# Patient Record
Sex: MALE | Race: WHITE | Employment: OTHER | URBAN - METROPOLITAN AREA
[De-identification: names, ages, dates, MRNs, and addresses within clinical notes are randomized per-mention and may not be internally consistent; named-entity substitution may affect disease eponyms.]

---

## 2020-02-24 ENCOUNTER — OFFICE VISIT (OUTPATIENT)
Dept: INTERNAL MEDICINE CLINIC | Facility: CLINIC | Age: 70
End: 2020-02-24
Payer: MEDICARE

## 2020-02-24 VITALS
HEART RATE: 71 BPM | RESPIRATION RATE: 12 BRPM | HEIGHT: 65 IN | SYSTOLIC BLOOD PRESSURE: 126 MMHG | WEIGHT: 181 LBS | BODY MASS INDEX: 30.16 KG/M2 | TEMPERATURE: 99 F | DIASTOLIC BLOOD PRESSURE: 70 MMHG

## 2020-02-24 DIAGNOSIS — I10 ESSENTIAL HYPERTENSION: ICD-10-CM

## 2020-02-24 DIAGNOSIS — E11.9 CONTROLLED TYPE 2 DIABETES MELLITUS WITHOUT COMPLICATION, WITHOUT LONG-TERM CURRENT USE OF INSULIN (HCC): Primary | ICD-10-CM

## 2020-02-24 DIAGNOSIS — Z23 IMMUNIZATION DUE: ICD-10-CM

## 2020-02-24 DIAGNOSIS — E78.00 HYPERCHOLESTEREMIA: ICD-10-CM

## 2020-02-24 PROCEDURE — G0463 HOSPITAL OUTPT CLINIC VISIT: HCPCS | Performed by: INTERNAL MEDICINE

## 2020-02-24 PROCEDURE — G0008 ADMIN INFLUENZA VIRUS VAC: HCPCS | Performed by: INTERNAL MEDICINE

## 2020-02-24 PROCEDURE — 90670 PCV13 VACCINE IM: CPT | Performed by: INTERNAL MEDICINE

## 2020-02-24 PROCEDURE — 96372 THER/PROPH/DIAG INJ SC/IM: CPT | Performed by: INTERNAL MEDICINE

## 2020-02-24 PROCEDURE — 99203 OFFICE O/P NEW LOW 30 MIN: CPT | Performed by: INTERNAL MEDICINE

## 2020-02-24 PROCEDURE — 90662 IIV NO PRSV INCREASED AG IM: CPT | Performed by: INTERNAL MEDICINE

## 2020-02-24 PROCEDURE — G0009 ADMIN PNEUMOCOCCAL VACCINE: HCPCS | Performed by: INTERNAL MEDICINE

## 2020-02-24 RX ORDER — ROSUVASTATIN CALCIUM 10 MG/1
10 TABLET, COATED ORAL NIGHTLY
Qty: 90 TABLET | Refills: 0 | Status: SHIPPED | OUTPATIENT
Start: 2020-02-24 | End: 2020-10-15

## 2020-02-24 RX ORDER — BLOOD SUGAR DIAGNOSTIC
STRIP MISCELLANEOUS
COMMUNITY
Start: 2019-10-08 | End: 2021-11-01

## 2020-02-24 RX ORDER — AMOXICILLIN 500 MG
1 CAPSULE ORAL DAILY
COMMUNITY

## 2020-02-24 RX ORDER — ROSUVASTATIN CALCIUM 10 MG/1
1 TABLET, COATED ORAL NIGHTLY
COMMUNITY
Start: 2020-01-03 | End: 2020-02-24

## 2020-02-24 RX ORDER — MULTIVITAMIN
1 TABLET ORAL DAILY
COMMUNITY

## 2020-02-24 RX ORDER — RAMIPRIL 5 MG/1
5 CAPSULE ORAL DAILY
COMMUNITY
Start: 2020-01-03 | End: 2020-02-24

## 2020-02-24 RX ORDER — RAMIPRIL 5 MG/1
5 CAPSULE ORAL DAILY
Qty: 90 CAPSULE | Refills: 0 | Status: SHIPPED | OUTPATIENT
Start: 2020-02-24 | End: 2020-10-15

## 2020-02-24 RX ORDER — LANCETS
EACH MISCELLANEOUS
COMMUNITY
Start: 2019-10-08

## 2020-02-24 NOTE — PROGRESS NOTES
HPI:    Patient ID: Tena Bernardo is a 78year old male. Diabetes   He presents for his initial diabetic visit. He has type 2 diabetes mellitus. The initial diagnosis of diabetes was made 1 year ago. His disease course has been stable.  There are no hypog gradually improving since onset. The problem is controlled. Pertinent negatives include no chest pain, palpitations, peripheral edema or shortness of breath. There are no associated agents to hypertension.  Risk factors for coronary artery disease include d Allergies:No Known Allergies   PHYSICAL EXAM:   Physical Exam   Constitutional: He is oriented to person, place, and time. He appears well-developed and well-nourished. No distress. HENT:   Head: Normocephalic and atraumatic.    Right Ear: External ea not recall his last A1c done 4 months ago so we will be rechecking his A1c and also do a UA. He saw his optometrist for an eye examination 6 months ago and there was no signs of diabetic retinopathy.   Patient will continue to check his glucose levels at l

## 2020-03-05 ENCOUNTER — APPOINTMENT (OUTPATIENT)
Dept: LAB | Age: 70
End: 2020-03-05
Attending: INTERNAL MEDICINE
Payer: MEDICARE

## 2020-03-05 DIAGNOSIS — E78.00 HYPERCHOLESTEREMIA: ICD-10-CM

## 2020-03-05 DIAGNOSIS — E11.9 CONTROLLED TYPE 2 DIABETES MELLITUS WITHOUT COMPLICATION, WITHOUT LONG-TERM CURRENT USE OF INSULIN (HCC): ICD-10-CM

## 2020-03-05 LAB
ALBUMIN SERPL-MCNC: 4.2 G/DL (ref 3.4–5)
ALBUMIN/GLOB SERPL: 1.3 {RATIO} (ref 1–2)
ALP LIVER SERPL-CCNC: 72 U/L (ref 45–117)
ALT SERPL-CCNC: 32 U/L (ref 16–61)
ANION GAP SERPL CALC-SCNC: 5 MMOL/L (ref 0–18)
AST SERPL-CCNC: 11 U/L (ref 15–37)
BILIRUB SERPL-MCNC: 0.5 MG/DL (ref 0.1–2)
BILIRUB UR QL: NEGATIVE
BUN BLD-MCNC: 16 MG/DL (ref 7–18)
BUN/CREAT SERPL: 15.1 (ref 10–20)
CALCIUM BLD-MCNC: 9.5 MG/DL (ref 8.5–10.1)
CHLORIDE SERPL-SCNC: 104 MMOL/L (ref 98–112)
CHOLEST SMN-MCNC: 126 MG/DL (ref ?–200)
CLARITY UR: CLEAR
CO2 SERPL-SCNC: 29 MMOL/L (ref 21–32)
COLOR UR: YELLOW
CREAT BLD-MCNC: 1.06 MG/DL (ref 0.7–1.3)
CREAT UR-SCNC: 115 MG/DL
EST. AVERAGE GLUCOSE BLD GHB EST-MCNC: 203 MG/DL (ref 68–126)
GLOBULIN PLAS-MCNC: 3.3 G/DL (ref 2.8–4.4)
GLUCOSE BLD-MCNC: 166 MG/DL (ref 70–99)
GLUCOSE UR-MCNC: NEGATIVE MG/DL
HBA1C MFR BLD HPLC: 8.7 % (ref ?–5.7)
HDLC SERPL-MCNC: 40 MG/DL (ref 40–59)
HGB UR QL STRIP.AUTO: NEGATIVE
KETONES UR-MCNC: NEGATIVE MG/DL
LDLC SERPL CALC-MCNC: 49 MG/DL (ref ?–100)
LEUKOCYTE ESTERASE UR QL STRIP.AUTO: NEGATIVE
M PROTEIN MFR SERPL ELPH: 7.5 G/DL (ref 6.4–8.2)
MICROALBUMIN UR-MCNC: 1.39 MG/DL
MICROALBUMIN/CREAT 24H UR-RTO: 12.1 UG/MG (ref ?–30)
NITRITE UR QL STRIP.AUTO: NEGATIVE
NONHDLC SERPL-MCNC: 86 MG/DL (ref ?–130)
OSMOLALITY SERPL CALC.SUM OF ELEC: 291 MOSM/KG (ref 275–295)
PATIENT FASTING Y/N/NP: YES
PATIENT FASTING Y/N/NP: YES
PH UR: 6 [PH] (ref 5–8)
POTASSIUM SERPL-SCNC: 4.4 MMOL/L (ref 3.5–5.1)
PROT UR-MCNC: NEGATIVE MG/DL
SODIUM SERPL-SCNC: 138 MMOL/L (ref 136–145)
SP GR UR STRIP: 1.02 (ref 1–1.03)
TRIGL SERPL-MCNC: 186 MG/DL (ref 30–149)
UROBILINOGEN UR STRIP-ACNC: <2
VLDLC SERPL CALC-MCNC: 37 MG/DL (ref 0–30)

## 2020-03-05 PROCEDURE — 36415 COLL VENOUS BLD VENIPUNCTURE: CPT

## 2020-03-05 PROCEDURE — 82043 UR ALBUMIN QUANTITATIVE: CPT

## 2020-03-05 PROCEDURE — 82570 ASSAY OF URINE CREATININE: CPT

## 2020-03-05 PROCEDURE — 83036 HEMOGLOBIN GLYCOSYLATED A1C: CPT

## 2020-03-05 PROCEDURE — 80061 LIPID PANEL: CPT

## 2020-03-05 PROCEDURE — 81003 URINALYSIS AUTO W/O SCOPE: CPT

## 2020-03-05 PROCEDURE — 80053 COMPREHEN METABOLIC PANEL: CPT

## 2020-05-26 ENCOUNTER — OFFICE VISIT (OUTPATIENT)
Dept: INTERNAL MEDICINE CLINIC | Facility: CLINIC | Age: 70
End: 2020-05-26
Payer: MEDICARE

## 2020-05-26 VITALS
RESPIRATION RATE: 12 BRPM | HEART RATE: 65 BPM | SYSTOLIC BLOOD PRESSURE: 124 MMHG | BODY MASS INDEX: 31.32 KG/M2 | TEMPERATURE: 99 F | HEIGHT: 65 IN | DIASTOLIC BLOOD PRESSURE: 78 MMHG | WEIGHT: 188 LBS

## 2020-05-26 DIAGNOSIS — E78.00 HYPERCHOLESTEREMIA: ICD-10-CM

## 2020-05-26 DIAGNOSIS — I10 ESSENTIAL HYPERTENSION: ICD-10-CM

## 2020-05-26 DIAGNOSIS — E11.65 UNCONTROLLED TYPE 2 DIABETES MELLITUS WITH HYPERGLYCEMIA (HCC): Primary | ICD-10-CM

## 2020-05-26 DIAGNOSIS — Z12.11 COLON CANCER SCREENING: ICD-10-CM

## 2020-05-26 PROCEDURE — G0463 HOSPITAL OUTPT CLINIC VISIT: HCPCS | Performed by: INTERNAL MEDICINE

## 2020-05-26 PROCEDURE — 99214 OFFICE O/P EST MOD 30 MIN: CPT | Performed by: INTERNAL MEDICINE

## 2020-05-26 NOTE — PROGRESS NOTES
HPI:    Patient ID: Camelia Root is a 78year old male. Diabetes   He presents for his follow-up diabetic visit. He has type 2 diabetes mellitus. His disease course has been fluctuating. There are no hypoglycemic associated symptoms.  There are no diabet Exacerbating diseases include diabetes. He has no history of chronic renal disease. There are no known factors aggravating his hyperlipidemia. Pertinent negatives include no chest pain or shortness of breath.  Current antihyperlipidemic treatment includes s normal.   Left Ear: External ear normal.   Nose: Nose normal.   Mouth/Throat: Oropharynx is clear and moist. No oropharyngeal exudate. Eyes: Pupils are equal, round, and reactive to light. Conjunctivae and EOM are normal. Right eye exhibits no discharge. CHF (congestive heart failure)    HLD (hyperlipidemia) Metabolic Panel (14) [E]      Lipid Panel [E]      Meds This Visit:  Requested Prescriptions      No prescriptions requested or ordered in this encounter       Imaging & Referrals:  None       AP#[id number] CHF (congestive heart failure)    Essential hypertension    HLD (hyperlipidemia)

## 2020-06-18 ENCOUNTER — APPOINTMENT (OUTPATIENT)
Dept: LAB | Age: 70
End: 2020-06-18
Attending: INTERNAL MEDICINE
Payer: MEDICARE

## 2020-06-18 DIAGNOSIS — E11.65 UNCONTROLLED TYPE 2 DIABETES MELLITUS WITH HYPERGLYCEMIA (HCC): ICD-10-CM

## 2020-06-18 DIAGNOSIS — E78.00 HYPERCHOLESTEREMIA: ICD-10-CM

## 2020-06-18 PROCEDURE — 80053 COMPREHEN METABOLIC PANEL: CPT

## 2020-06-18 PROCEDURE — 83036 HEMOGLOBIN GLYCOSYLATED A1C: CPT

## 2020-06-18 PROCEDURE — 36415 COLL VENOUS BLD VENIPUNCTURE: CPT

## 2020-06-18 PROCEDURE — 80061 LIPID PANEL: CPT

## 2020-06-20 ENCOUNTER — APPOINTMENT (OUTPATIENT)
Dept: LAB | Age: 70
End: 2020-06-20
Attending: INTERNAL MEDICINE
Payer: MEDICARE

## 2020-06-20 DIAGNOSIS — Z12.11 COLON CANCER SCREENING: ICD-10-CM

## 2020-06-20 PROCEDURE — 82274 ASSAY TEST FOR BLOOD FECAL: CPT

## 2020-06-23 RX ORDER — GLIMEPIRIDE 1 MG/1
1 TABLET ORAL
Qty: 90 TABLET | Refills: 0 | Status: SHIPPED | OUTPATIENT
Start: 2020-06-23 | End: 2020-07-10

## 2020-07-10 ENCOUNTER — TELEPHONE (OUTPATIENT)
Dept: INTERNAL MEDICINE CLINIC | Facility: CLINIC | Age: 70
End: 2020-07-10

## 2020-07-10 RX ORDER — GLIMEPIRIDE 1 MG/1
1 TABLET ORAL
Qty: 90 TABLET | Refills: 0 | Status: SHIPPED | OUTPATIENT
Start: 2020-07-10 | End: 2020-10-15

## 2020-07-10 NOTE — TELEPHONE ENCOUNTER
•  glimepiride 1 MG Oral Tab, Take 1 tablet (1 mg total) by mouth daily with breakfast., Disp: 90 tablet, Rfl: 0

## 2020-10-15 ENCOUNTER — OFFICE VISIT (OUTPATIENT)
Dept: INTERNAL MEDICINE CLINIC | Facility: CLINIC | Age: 70
End: 2020-10-15
Payer: MEDICARE

## 2020-10-15 VITALS
DIASTOLIC BLOOD PRESSURE: 76 MMHG | BODY MASS INDEX: 31.49 KG/M2 | HEIGHT: 65 IN | TEMPERATURE: 97 F | HEART RATE: 70 BPM | SYSTOLIC BLOOD PRESSURE: 128 MMHG | WEIGHT: 189 LBS

## 2020-10-15 DIAGNOSIS — I10 ESSENTIAL HYPERTENSION: ICD-10-CM

## 2020-10-15 DIAGNOSIS — E78.00 HYPERCHOLESTEREMIA: ICD-10-CM

## 2020-10-15 DIAGNOSIS — E11.65 UNCONTROLLED TYPE 2 DIABETES MELLITUS WITH HYPERGLYCEMIA (HCC): Primary | ICD-10-CM

## 2020-10-15 PROCEDURE — 36416 COLLJ CAPILLARY BLOOD SPEC: CPT | Performed by: INTERNAL MEDICINE

## 2020-10-15 PROCEDURE — 83036 HEMOGLOBIN GLYCOSYLATED A1C: CPT | Performed by: INTERNAL MEDICINE

## 2020-10-15 PROCEDURE — 90662 IIV NO PRSV INCREASED AG IM: CPT | Performed by: INTERNAL MEDICINE

## 2020-10-15 PROCEDURE — G0463 HOSPITAL OUTPT CLINIC VISIT: HCPCS | Performed by: INTERNAL MEDICINE

## 2020-10-15 PROCEDURE — G0008 ADMIN INFLUENZA VIRUS VAC: HCPCS | Performed by: INTERNAL MEDICINE

## 2020-10-15 PROCEDURE — 99214 OFFICE O/P EST MOD 30 MIN: CPT | Performed by: INTERNAL MEDICINE

## 2020-10-15 RX ORDER — GLIMEPIRIDE 1 MG/1
1 TABLET ORAL
Qty: 90 TABLET | Refills: 1 | Status: CANCELLED | OUTPATIENT
Start: 2020-10-15

## 2020-10-15 RX ORDER — ROSUVASTATIN CALCIUM 10 MG/1
10 TABLET, COATED ORAL NIGHTLY
Qty: 90 TABLET | Refills: 1 | Status: SHIPPED | OUTPATIENT
Start: 2020-10-15 | End: 2021-05-14

## 2020-10-15 RX ORDER — GLIMEPIRIDE 2 MG/1
2 TABLET ORAL
Qty: 90 TABLET | Refills: 1 | Status: SHIPPED | OUTPATIENT
Start: 2020-10-15 | End: 2021-05-14

## 2020-10-15 RX ORDER — RAMIPRIL 5 MG/1
5 CAPSULE ORAL DAILY
Qty: 90 CAPSULE | Refills: 1 | Status: SHIPPED | OUTPATIENT
Start: 2020-10-15 | End: 2021-05-14

## 2020-10-15 NOTE — PROGRESS NOTES
HPI:    Patient ID: Levern Soulier is a 69year old male. Diabetes  He presents for his follow-up diabetic visit. He has type 2 diabetes mellitus. His disease course has been fluctuating. There are no hypoglycemic associated symptoms.  There are no diabeti are normal. Exacerbating diseases include diabetes. He has no history of chronic renal disease. There are no known factors aggravating his hyperlipidemia. Pertinent negatives include no chest pain or shortness of breath.  Current antihyperlipidemic treatmen Essential hypertension    • Hyperlipidemia       History reviewed. No pertinent surgical history.    Family History   Problem Relation Age of Onset   • Stroke Mother    • Diabetes Sister    • Cancer Brother    • Diabetes Brother       Social History: Social today and showed 8 so dm improving but told him needs to get to 7; increased glimepiride to 2mg daily . Monitor glucose daily and warned about hypoglycemia as we tighten control. Pt told how to manage if hypoglycemia occures. We will see him back in 3mos.

## 2021-02-03 DIAGNOSIS — Z23 NEED FOR VACCINATION: ICD-10-CM

## 2021-02-05 ENCOUNTER — TELEPHONE (OUTPATIENT)
Dept: INTERNAL MEDICINE CLINIC | Facility: CLINIC | Age: 71
End: 2021-02-05

## 2021-02-09 ENCOUNTER — IMMUNIZATION (OUTPATIENT)
Dept: LAB | Age: 71
End: 2021-02-09
Attending: HOSPITALIST
Payer: MEDICARE

## 2021-02-09 DIAGNOSIS — Z23 NEED FOR VACCINATION: Primary | ICD-10-CM

## 2021-02-09 PROCEDURE — 0001A SARSCOV2 VAC 30MCG/0.3ML IM: CPT

## 2021-03-02 ENCOUNTER — IMMUNIZATION (OUTPATIENT)
Dept: LAB | Age: 71
End: 2021-03-02
Attending: HOSPITALIST
Payer: MEDICARE

## 2021-03-02 DIAGNOSIS — Z23 NEED FOR VACCINATION: Primary | ICD-10-CM

## 2021-03-02 PROCEDURE — 0002A SARSCOV2 VAC 30MCG/0.3ML IM: CPT

## 2021-05-14 ENCOUNTER — LAB ENCOUNTER (OUTPATIENT)
Dept: LAB | Age: 71
End: 2021-05-14
Attending: INTERNAL MEDICINE
Payer: MEDICARE

## 2021-05-14 ENCOUNTER — TELEPHONE (OUTPATIENT)
Dept: INTERNAL MEDICINE CLINIC | Facility: CLINIC | Age: 71
End: 2021-05-14

## 2021-05-14 ENCOUNTER — OFFICE VISIT (OUTPATIENT)
Dept: INTERNAL MEDICINE CLINIC | Facility: CLINIC | Age: 71
End: 2021-05-14
Payer: MEDICARE

## 2021-05-14 VITALS
HEART RATE: 61 BPM | WEIGHT: 185 LBS | RESPIRATION RATE: 12 BRPM | TEMPERATURE: 98 F | BODY MASS INDEX: 30.82 KG/M2 | DIASTOLIC BLOOD PRESSURE: 70 MMHG | SYSTOLIC BLOOD PRESSURE: 126 MMHG | HEIGHT: 65 IN

## 2021-05-14 DIAGNOSIS — E11.65 UNCONTROLLED TYPE 2 DIABETES MELLITUS WITH HYPERGLYCEMIA (HCC): Primary | ICD-10-CM

## 2021-05-14 DIAGNOSIS — E11.65 UNCONTROLLED TYPE 2 DIABETES MELLITUS WITH HYPERGLYCEMIA (HCC): ICD-10-CM

## 2021-05-14 DIAGNOSIS — Z12.11 COLON CANCER SCREENING: ICD-10-CM

## 2021-05-14 DIAGNOSIS — L98.9 SKIN LESIONS: ICD-10-CM

## 2021-05-14 DIAGNOSIS — E11.59 HYPERTENSION ASSOCIATED WITH TYPE 2 DIABETES MELLITUS (HCC): ICD-10-CM

## 2021-05-14 DIAGNOSIS — L03.116 CELLULITIS OF LEFT LOWER EXTREMITY: ICD-10-CM

## 2021-05-14 DIAGNOSIS — E78.5 HYPERLIPIDEMIA ASSOCIATED WITH TYPE 2 DIABETES MELLITUS (HCC): ICD-10-CM

## 2021-05-14 DIAGNOSIS — E11.9 DIABETIC EYE EXAM (HCC): ICD-10-CM

## 2021-05-14 DIAGNOSIS — I15.2 HYPERTENSION ASSOCIATED WITH TYPE 2 DIABETES MELLITUS (HCC): ICD-10-CM

## 2021-05-14 DIAGNOSIS — Z12.5 PROSTATE CANCER SCREENING: ICD-10-CM

## 2021-05-14 DIAGNOSIS — Z01.00 DIABETIC EYE EXAM (HCC): ICD-10-CM

## 2021-05-14 DIAGNOSIS — Z13.6 SCREENING FOR AAA (ABDOMINAL AORTIC ANEURYSM): ICD-10-CM

## 2021-05-14 DIAGNOSIS — E11.69 HYPERLIPIDEMIA ASSOCIATED WITH TYPE 2 DIABETES MELLITUS (HCC): ICD-10-CM

## 2021-05-14 PROCEDURE — 82570 ASSAY OF URINE CREATININE: CPT

## 2021-05-14 PROCEDURE — 83036 HEMOGLOBIN GLYCOSYLATED A1C: CPT | Performed by: INTERNAL MEDICINE

## 2021-05-14 PROCEDURE — 85025 COMPLETE CBC W/AUTO DIFF WBC: CPT

## 2021-05-14 PROCEDURE — G0009 ADMIN PNEUMOCOCCAL VACCINE: HCPCS | Performed by: INTERNAL MEDICINE

## 2021-05-14 PROCEDURE — 80061 LIPID PANEL: CPT

## 2021-05-14 PROCEDURE — 82043 UR ALBUMIN QUANTITATIVE: CPT

## 2021-05-14 PROCEDURE — 36415 COLL VENOUS BLD VENIPUNCTURE: CPT

## 2021-05-14 PROCEDURE — 99214 OFFICE O/P EST MOD 30 MIN: CPT | Performed by: INTERNAL MEDICINE

## 2021-05-14 PROCEDURE — 90732 PPSV23 VACC 2 YRS+ SUBQ/IM: CPT | Performed by: INTERNAL MEDICINE

## 2021-05-14 PROCEDURE — 80053 COMPREHEN METABOLIC PANEL: CPT

## 2021-05-14 RX ORDER — ROSUVASTATIN CALCIUM 10 MG/1
10 TABLET, COATED ORAL NIGHTLY
Qty: 90 TABLET | Refills: 1 | Status: SHIPPED | OUTPATIENT
Start: 2021-05-14 | End: 2021-11-01

## 2021-05-14 RX ORDER — RAMIPRIL 5 MG/1
5 CAPSULE ORAL DAILY
Qty: 90 CAPSULE | Refills: 1 | Status: SHIPPED | OUTPATIENT
Start: 2021-05-14 | End: 2021-11-01

## 2021-05-14 RX ORDER — GLIMEPIRIDE 2 MG/1
2 TABLET ORAL
Qty: 90 TABLET | Refills: 1 | Status: SHIPPED | OUTPATIENT
Start: 2021-05-14 | End: 2021-11-01

## 2021-05-14 RX ORDER — CEPHALEXIN 500 MG/1
500 CAPSULE ORAL 2 TIMES DAILY
Qty: 14 CAPSULE | Refills: 0 | Status: SHIPPED | OUTPATIENT
Start: 2021-05-14 | End: 2021-11-01 | Stop reason: ALTCHOICE

## 2021-05-14 NOTE — PROGRESS NOTES
HPI/Subjective:     Patient ID: Juliet Hilliard is a 69year old male. Patient also complained of redness and some soreness on the left ankle/lateral malleolar area. Started 2 days ago after he walked in the park.  He denies having trauma, twisting his left chronic renal disease or a hypertension causing med. Hyperlipidemia  This is a chronic problem. The current episode started more than 1 year ago. The problem is controlled.  Recent lipid tests were reviewed and are normal. Exacerbating diseases include di Oral Cap Take 1 tablet by mouth daily.        Allergies:No Known Allergies    Past Medical History:   Diagnosis Date   • Calculus of kidney     2003   • Diabetes ([address]     2019   • Essential hypertension    • Hyperlipidemia       No past surgical history on Genitourinary:     Prostate: Normal. Not enlarged, not tender and no nodules present. Rectum: Normal. No mass, tenderness, anal fissure or external hemorrhoid. Normal anal tone. Musculoskeletal:         General: Normal range of motion.       Cervical E11.9) Diabetic eye exam ([facility] [address]  Plan: OPHTHALMOLOGY - INTERNAL       Pt advised to see optha for dm eye exam. Referral given.      (Z13.6) Screening for AAA (abdominal aortic aneurysm)  Plan: US ABDOMINAL AORTIC ANEURYSM SCREENING         (CPT=76706)

## 2021-05-14 NOTE — TELEPHONE ENCOUNTER
Spoke with pt's daughter per [employer], TERRI verified. She stated pt need rx ref for Ramipril  5 mg, rosuvastain 10 mg every day. pls advise, thanks in advance.          Requested Prescriptions     Pending Prescriptions Disp Refills   • ramipril 5 MG Oral Cap 9

## 2021-05-14 NOTE — TELEPHONE ENCOUNTER
I had sent erx this morning yamilet for these meds but I  Signed erx again just in case pharmacy didn't get it before.

## 2021-05-14 NOTE — TELEPHONE ENCOUNTER
Spoke to [place] and patient's daughter picked up Glimepiride and Metformin. She thought there were two more medications. Tried to call daughter. Shanna Cotter to verify which medications were missing. Left message to call back on her voicemail.

## 2021-05-14 NOTE — TELEPHONE ENCOUNTER
Patient's Daughter is calling and stating patient is currently at the pharmacy and they do not have anything from Dr. Chastity Nascimento for him.      It looks like they were sent over this morning around 9:05 AM. But patient is insisting they don't have anything f

## 2021-05-14 NOTE — TELEPHONE ENCOUNTER
Daughter requesting to know if patient should still be taking these medications. ramipril 5 MG Oral Cap 90 capsule 1 10/15/2020    Sig:   Take 1 capsule (5 mg total) by mouth daily.      Route:   Oral     Order #: [id number]       Rosuvastatin Calcium 10

## 2021-05-15 NOTE — TELEPHONE ENCOUNTER
I want to make sure he gets all his regular meds, metformin, glimepiride and rosuvastatin. Also I had also sent abx cephalexin yesterday for possible skin infection on his left ankle so also make sure he gets it.  maria alejandra deng.

## 2021-05-17 NOTE — TELEPHONE ENCOUNTER
Spoke with pt daughter and pt daughter states she will contact pt to make sure he picked up antibiotics with his other medications.

## 2021-05-28 ENCOUNTER — LAB ENCOUNTER (OUTPATIENT)
Dept: LAB | Age: 71
End: 2021-05-28
Attending: INTERNAL MEDICINE
Payer: MEDICARE

## 2021-05-28 DIAGNOSIS — E78.5 HYPERLIPIDEMIA ASSOCIATED WITH TYPE 2 DIABETES MELLITUS (HCC): ICD-10-CM

## 2021-05-28 DIAGNOSIS — E11.69 HYPERLIPIDEMIA ASSOCIATED WITH TYPE 2 DIABETES MELLITUS (HCC): ICD-10-CM

## 2021-05-28 DIAGNOSIS — E11.59 HYPERTENSION ASSOCIATED WITH TYPE 2 DIABETES MELLITUS (HCC): ICD-10-CM

## 2021-05-28 DIAGNOSIS — Z12.11 COLON CANCER SCREENING: ICD-10-CM

## 2021-05-28 DIAGNOSIS — I15.2 HYPERTENSION ASSOCIATED WITH TYPE 2 DIABETES MELLITUS (HCC): ICD-10-CM

## 2021-05-28 DIAGNOSIS — E11.65 UNCONTROLLED TYPE 2 DIABETES MELLITUS WITH HYPERGLYCEMIA (HCC): ICD-10-CM

## 2021-05-28 PROCEDURE — 82274 ASSAY TEST FOR BLOOD FECAL: CPT

## 2021-06-01 ENCOUNTER — TELEPHONE (OUTPATIENT)
Dept: INTERNAL MEDICINE CLINIC | Facility: CLINIC | Age: 71
End: 2021-06-01

## 2021-06-01 DIAGNOSIS — Z12.11 ENCOUNTER FOR SCREENING COLONOSCOPY: Primary | ICD-10-CM

## 2021-06-01 DIAGNOSIS — R19.5 POSITIVE FIT (FECAL IMMUNOCHEMICAL TEST): ICD-10-CM

## 2021-06-01 NOTE — TELEPHONE ENCOUNTER
Spoke with patient and let him know test results and to follow up with GI, Dr Graeme Fowler. Pt provided with name and phone number for GI office. Dr Yue Kaufman if referral below looks correct, please sign off. Thank you.

## 2021-07-23 ENCOUNTER — TELEPHONE (OUTPATIENT)
Dept: INTERNAL MEDICINE CLINIC | Facility: CLINIC | Age: 71
End: 2021-07-23

## 2021-07-23 NOTE — TELEPHONE ENCOUNTER
pls call pt and check if he already had made apptment or seen gastro as advised back in May 2021 for a positive fit stool test, he needs to get colonoscopy to evaluate further and make sure no growth/tumors or even colon cancer.

## 2021-07-23 NOTE — TELEPHONE ENCOUNTER
Advised patient of Dr. Gracie Rubio note. Patient verbalized understanding and did not schedule appointment with GI yet. Warm transferred patient to GI.      Future Appointments   Date Time Provider Nelson Pollock   9/21/2021 10:00 AM Miya Victoria

## 2021-09-19 NOTE — H&P
[address] [facility] [address] Gastroenterology                                                                                                               Reason for consult: p used    Alcohol use: Not Currently    Drug use: Never       Medications (Active prior to today's visit):  Current Outpatient Medications   Medication Sig Dispense Refill   • glimepiride 2 MG Oral Tab Take 1 tablet (2 mg total) by mouth every morning before trachea midline  CV: RRR, the extremities are warm and well perfused   LUNGS: No increased work of breathing  ABDOMEN: No scars, normal bowel sounds, soft, non-tender, non-distended no rebound or guarding, no masses, no hepatomegaly  MSK: No redness, no wa BUN/CREA Ratio 16.1 10.0 - 20.0    Calcium, Total 9.3 8.5 - 10.1 mg/dL    Calculated Osmolality 287 275 - 295 mOsm/kg    GFR, Non- 82 >=60    GFR, -American 95 >=60    ALT 31 16 - 61 U/L    AST 22 15 - 37 U/L    Alkaline Phosphata ordered in this encounter       Imaging & Referrals:  None    ENDOSCOPIC RISK BENEFIT DISCUSSION: I described the procedure in great detail with the patient.  I discussed the risks and benefits, including but not limited to: bleeding, perforation, infection

## 2021-09-19 NOTE — H&P (VIEW-ONLY)
[address] [facility] [address] Gastroenterology                                                                                                               Reason for consult: p used    Alcohol use: Not Currently    Drug use: Never       Medications (Active prior to today's visit):  Current Outpatient Medications   Medication Sig Dispense Refill   • glimepiride 2 MG Oral Tab Take 1 tablet (2 mg total) by mouth every morning before trachea midline  CV: RRR, the extremities are warm and well perfused   LUNGS: No increased work of breathing  ABDOMEN: No scars, normal bowel sounds, soft, non-tender, non-distended no rebound or guarding, no masses, no hepatomegaly  MSK: No redness, no wa BUN/CREA Ratio 16.1 10.0 - 20.0    Calcium, Total 9.3 8.5 - 10.1 mg/dL    Calculated Osmolality 287 275 - 295 mOsm/kg    GFR, Non- 82 >=60    GFR, -American 95 >=60    ALT 31 16 - 61 U/L    AST 22 15 - 37 U/L    Alkaline Phosphata ordered in this encounter       Imaging & Referrals:  None    ENDOSCOPIC RISK BENEFIT DISCUSSION: I described the procedure in great detail with the patient.  I discussed the risks and benefits, including but not limited to: bleeding, perforation, infection

## 2021-09-21 ENCOUNTER — OFFICE VISIT (OUTPATIENT)
Dept: GASTROENTEROLOGY | Facility: CLINIC | Age: 71
End: 2021-09-21
Payer: MEDICARE

## 2021-09-21 ENCOUNTER — TELEPHONE (OUTPATIENT)
Dept: GASTROENTEROLOGY | Facility: CLINIC | Age: 71
End: 2021-09-21

## 2021-09-21 VITALS
SYSTOLIC BLOOD PRESSURE: 136 MMHG | BODY MASS INDEX: 31.99 KG/M2 | WEIGHT: 192 LBS | DIASTOLIC BLOOD PRESSURE: 73 MMHG | HEART RATE: 71 BPM | HEIGHT: 65 IN

## 2021-09-21 DIAGNOSIS — R19.5 OCCULT BLOOD POSITIVE STOOL: Primary | ICD-10-CM

## 2021-09-21 DIAGNOSIS — R19.5 POSITIVE FIT (FECAL IMMUNOCHEMICAL TEST): Primary | ICD-10-CM

## 2021-09-21 PROCEDURE — 99203 OFFICE O/P NEW LOW 30 MIN: CPT | Performed by: NURSE PRACTITIONER

## 2021-09-21 RX ORDER — POLYETHYLENE GLYCOL 3350, SODIUM CHLORIDE, SODIUM BICARBONATE, POTASSIUM CHLORIDE 420; 11.2; 5.72; 1.48 G/4L; G/4L; G/4L; G/4L
POWDER, FOR SOLUTION ORAL
Qty: 4000 ML | Refills: 0 | Status: SHIPPED | OUTPATIENT
Start: 2021-09-21 | End: 2021-10-14

## 2021-09-21 NOTE — TELEPHONE ENCOUNTER
Scheduled for:  Colonoscopy 35877  Provider Name:  Dr Luana Llamas  Date:  10/14/21  Location:  [address] [place] [address]  Sedation:  MAC  Time:  8:30am (pt is aware to arrive at 7:30am)  Prep:  Trilyte  Meds/Allergies Reconciled?:  Beverley/NP reviewed.    Diagnosis with codes:  Posi

## 2021-09-21 NOTE — PATIENT INSTRUCTIONS
1. Schedule colonoscopy with MAC w/ first avail [Diagnosis: pos fit]    2.  bowel prep from pharmacy (split Segopotsote)    3. Hold glimepriride, metformin day before and day of procedure  Hold ramipril day of procedure    4.  Read all bowel prep instr

## 2021-10-08 ENCOUNTER — TELEPHONE (OUTPATIENT)
Dept: GASTROENTEROLOGY | Facility: CLINIC | Age: 71
End: 2021-10-08

## 2021-10-08 NOTE — TELEPHONE ENCOUNTER
Román Salinas scheduled for colonoscopy with Dr. Katlin Costello next Thursday, 10/14/2021. [facility] location and 7:30 AM arrival time. Patient was scheduled at [facility] and physical copy handed to him. Returned Carolyn call to clarify any questions/concerns.

## 2021-10-08 NOTE — TELEPHONE ENCOUNTER
Pts daughter called to review instructions for pts 10/14/21 colonoscopy. She is having problems getting into My Chart to read them. Please all.

## 2021-10-11 ENCOUNTER — TELEPHONE (OUTPATIENT)
Dept: GASTROENTEROLOGY | Facility: CLINIC | Age: 71
End: 2021-10-11

## 2021-10-11 ENCOUNTER — LAB ENCOUNTER (OUTPATIENT)
Dept: LAB | Age: 71
End: 2021-10-11
Attending: INTERNAL MEDICINE
Payer: MEDICARE

## 2021-10-11 DIAGNOSIS — Z01.818 PRE-OP TESTING: ICD-10-CM

## 2021-10-11 NOTE — TELEPHONE ENCOUNTER
Provided patients daughter with below update. Advised will be reaching on Wednesday morning for symptom update along with awaiting covid final results. Will re-evaluate at this time to assure optimal to proceed with scheduled colonoscopy.

## 2021-10-11 NOTE — TELEPHONE ENCOUNTER
Would wait for COVID test and call back with symptoms Wed morning and if still ongoing would have to clear with anesthesia and consider rapid test as well.  It is diagnostic for + FIT so do not want to delay too much

## 2021-10-11 NOTE — TELEPHONE ENCOUNTER
Patients daughter Mey Garcia calling for patient that has a cold today, asking if he is allowed to take anything for his cold and if he is ok to keep colonoscopy scheduled 10/14. Please call at [phone], to leave message,thanks.

## 2021-10-11 NOTE — TELEPHONE ENCOUNTER
Dr. Estes Read--    Patient has runny nose, which started today. Denies fever, chills, headache and coughing/sneezing. Hasn't been around anyone recently covid (+) and no traveling. Went for covid testing today for pre-procedural testing.  Results still

## 2021-10-13 NOTE — TELEPHONE ENCOUNTER
Dr. Jarred Blancas--    See below condition update per patient. Pre-procedural covid testing result negative. Please advise if you'd like to proceed with colonoscopy tomorrow and/or re-schedule based on current status.      Thank you

## 2021-10-13 NOTE — TELEPHONE ENCOUNTER
Carolyn returned call, she states she is going to work, her father told her he is not sneezing anymore, he coughs occasionally and no sore throat. . but if you prefer to reschedule the procedure he is ok with that. . just let them know.

## 2021-10-13 NOTE — TELEPHONE ENCOUNTER
Contacted Carolyn to follow up on Ray condition update. States she needs to contact him to obtain symptoms since she doesn't live with him and unsure on how he's feeling this morning.      Verified number to call our office back once she speaks with lisa

## 2021-10-14 ENCOUNTER — ANESTHESIA EVENT (OUTPATIENT)
Dept: ENDOSCOPY | Age: 71
End: 2021-10-14
Payer: MEDICARE

## 2021-10-14 ENCOUNTER — HOSPITAL ENCOUNTER (OUTPATIENT)
Age: 71
Setting detail: HOSPITAL OUTPATIENT SURGERY
Discharge: HOME | End: 2021-10-14
Attending: INTERNAL MEDICINE | Admitting: INTERNAL MEDICINE
Payer: MEDICARE

## 2021-10-14 ENCOUNTER — ANESTHESIA (OUTPATIENT)
Dept: ENDOSCOPY | Age: 71
End: 2021-10-14
Payer: MEDICARE

## 2021-10-14 VITALS
OXYGEN SATURATION: 96 % | RESPIRATION RATE: 19 BRPM | BODY MASS INDEX: 36.71 KG/M2 | DIASTOLIC BLOOD PRESSURE: 73 MMHG | HEIGHT: 60 IN | WEIGHT: 187 LBS | SYSTOLIC BLOOD PRESSURE: 124 MMHG | HEART RATE: 66 BPM

## 2021-10-14 DIAGNOSIS — R19.5 OCCULT BLOOD POSITIVE STOOL: ICD-10-CM

## 2021-10-14 DIAGNOSIS — Z01.818 PRE-OP TESTING: Primary | ICD-10-CM

## 2021-10-14 PROCEDURE — 45385 COLONOSCOPY W/LESION REMOVAL: CPT | Performed by: INTERNAL MEDICINE

## 2021-10-14 PROCEDURE — 99070 SPECIAL SUPPLIES PHYS/QHP: CPT | Performed by: INTERNAL MEDICINE

## 2021-10-14 PROCEDURE — 45380 COLONOSCOPY AND BIOPSY: CPT | Performed by: INTERNAL MEDICINE

## 2021-10-14 RX ORDER — SODIUM CHLORIDE, SODIUM LACTATE, POTASSIUM CHLORIDE, CALCIUM CHLORIDE 600; 310; 30; 20 MG/100ML; MG/100ML; MG/100ML; MG/100ML
INJECTION, SOLUTION INTRAVENOUS CONTINUOUS
Status: DISCONTINUED | OUTPATIENT
Start: 2021-10-14 | End: 2021-10-14

## 2021-10-14 RX ORDER — LIDOCAINE HYDROCHLORIDE 10 MG/ML
INJECTION, SOLUTION EPIDURAL; INFILTRATION; INTRACAUDAL; PERINEURAL AS NEEDED
Status: DISCONTINUED | OUTPATIENT
Start: 2021-10-14 | End: 2021-10-14 | Stop reason: SURG

## 2021-10-14 RX ADMIN — LIDOCAINE HYDROCHLORIDE 50 MG: 10 INJECTION, SOLUTION EPIDURAL; INFILTRATION; INTRACAUDAL; PERINEURAL at 08:46:00

## 2021-10-14 NOTE — ANESTHESIA PREPROCEDURE EVALUATION
Anesthesia PreOp Note    HPI:     Mohini Miller is a 66year old male who presents for preoperative consultation requested by: Bao Vizcaino MD    Date of Surgery: 10/14/2021    Procedure(s):  COLONOSCOPY  Indication: Occult blood positive stool    Re once daily, Disp: , Rfl:   Accu-Chek Softclix Lancets Does not apply Misc, Use once daily, Disp: , Rfl:   Misc Natural Products (GLUCOSAMINE CHOND MSM FORMULA OR), Take 1 tablet by mouth daily. , Disp: , Rfl:       No current [facility]-ordered facility-administe Organizations: Not on file      Attends Club or Organization Meetings: Not on file      Marital Status: Not on file  Intimate Partner Violence:       Fear of Current or Ex-Partner: Not on file      Emotionally Abused: Not on file      Physically Abused: No

## 2021-10-14 NOTE — INTERVAL H&P NOTE
Pre-op Diagnosis: Occult blood positive stool    The above referenced H&P was reviewed by Robb Anthony MD on 10/14/2021, the patient was examined and no significant changes have occurred in the patient's condition since the H&P was performed.   I discussed

## 2021-10-14 NOTE — OPERATIVE REPORT
COLONOSCOPY REPORT    Sandra Yanes      Age 66year old   PCP Trever Izquierdo MD Endoscopist Austin Abreu MD     Date of procedure: 10/14/21    Procedure: Colonoscopy w/ cold snare and cold biopsy polypectomy    Pre-operative diagnos and retrieved. 2. Diverticulosis: none appreciated. 3. Terminal ileum: the visualized mucosa appeared normal.    4. A retroflexed view of the rectum revealed hemorrhoids, large.     5. The colonic mucosa throughout the colon showed normal vascular pat

## 2021-10-14 NOTE — ANESTHESIA POSTPROCEDURE EVALUATION
Patient: Lisa Gruber    Procedure Summary     Date: 10/14/21 Room / Location: [facility] / [address] [facility] 1 ENDO    Anesthesia Start: 2150 Anesthesia Stop: 5299    Procedure: COLONOSCOPY (N/A ) Diagnosis:       Occult blood positive stool      (Colon ;

## 2021-10-15 ENCOUNTER — TELEPHONE (OUTPATIENT)
Dept: GASTROENTEROLOGY | Facility: CLINIC | Age: 71
End: 2021-10-15

## 2021-10-15 NOTE — TELEPHONE ENCOUNTER
Entered into [facility]. Recall CLN in 3 years per Dr. Rani Ovalles. Last CLN done 10/14/2021. Recall entered into Patient Outreach for 10/14/2024. HM updated.

## 2021-10-15 NOTE — TELEPHONE ENCOUNTER
----- Message from Robb Anthony MD sent at 10/15/2021  3:20 PM CDT -----  GI staff: please place recall for colonoscopy in 3 years

## 2021-11-01 ENCOUNTER — OFFICE VISIT (OUTPATIENT)
Dept: INTERNAL MEDICINE CLINIC | Facility: CLINIC | Age: 71
End: 2021-11-01
Payer: MEDICARE

## 2021-11-01 VITALS
DIASTOLIC BLOOD PRESSURE: 66 MMHG | BODY MASS INDEX: 31.4 KG/M2 | RESPIRATION RATE: 18 BRPM | HEIGHT: 65 IN | WEIGHT: 188.5 LBS | HEART RATE: 76 BPM | SYSTOLIC BLOOD PRESSURE: 124 MMHG

## 2021-11-01 DIAGNOSIS — E11.9 DIABETIC EYE EXAM (HCC): ICD-10-CM

## 2021-11-01 DIAGNOSIS — E11.69 HYPERLIPIDEMIA ASSOCIATED WITH TYPE 2 DIABETES MELLITUS (HCC): ICD-10-CM

## 2021-11-01 DIAGNOSIS — Z00.00 MEDICARE ANNUAL WELLNESS VISIT, SUBSEQUENT: Primary | ICD-10-CM

## 2021-11-01 DIAGNOSIS — E11.59 HYPERTENSION ASSOCIATED WITH TYPE 2 DIABETES MELLITUS (HCC): ICD-10-CM

## 2021-11-01 DIAGNOSIS — N52.9 ERECTILE DYSFUNCTION, UNSPECIFIED ERECTILE DYSFUNCTION TYPE: ICD-10-CM

## 2021-11-01 DIAGNOSIS — E11.9 CONTROLLED TYPE 2 DIABETES MELLITUS WITHOUT COMPLICATION, WITHOUT LONG-TERM CURRENT USE OF INSULIN (HCC): ICD-10-CM

## 2021-11-01 DIAGNOSIS — Z01.00 DIABETIC EYE EXAM (HCC): ICD-10-CM

## 2021-11-01 DIAGNOSIS — E78.5 HYPERLIPIDEMIA ASSOCIATED WITH TYPE 2 DIABETES MELLITUS (HCC): ICD-10-CM

## 2021-11-01 DIAGNOSIS — I15.2 HYPERTENSION ASSOCIATED WITH TYPE 2 DIABETES MELLITUS (HCC): ICD-10-CM

## 2021-11-01 PROBLEM — E78.00 HYPERCHOLESTEREMIA: Status: RESOLVED | Noted: 2020-10-15 | Resolved: 2021-11-01

## 2021-11-01 PROBLEM — I10 ESSENTIAL HYPERTENSION: Status: RESOLVED | Noted: 2020-10-15 | Resolved: 2021-11-01

## 2021-11-01 PROCEDURE — 90662 IIV NO PRSV INCREASED AG IM: CPT | Performed by: INTERNAL MEDICINE

## 2021-11-01 PROCEDURE — G0008 ADMIN INFLUENZA VIRUS VAC: HCPCS | Performed by: INTERNAL MEDICINE

## 2021-11-01 PROCEDURE — G0439 PPPS, SUBSEQ VISIT: HCPCS | Performed by: INTERNAL MEDICINE

## 2021-11-01 RX ORDER — ROSUVASTATIN CALCIUM 10 MG/1
10 TABLET, COATED ORAL NIGHTLY
Qty: 90 TABLET | Refills: 1 | Status: SHIPPED | OUTPATIENT
Start: 2021-11-01 | End: 2022-02-01

## 2021-11-01 RX ORDER — GLIMEPIRIDE 2 MG/1
2 TABLET ORAL
Qty: 90 TABLET | Refills: 1 | Status: SHIPPED | OUTPATIENT
Start: 2021-11-01 | End: 2022-02-02

## 2021-11-01 RX ORDER — RAMIPRIL 5 MG/1
5 CAPSULE ORAL DAILY
Qty: 90 CAPSULE | Refills: 1 | Status: SHIPPED | OUTPATIENT
Start: 2021-11-01 | End: 2022-02-01

## 2021-11-01 RX ORDER — BLOOD SUGAR DIAGNOSTIC
STRIP MISCELLANEOUS
Qty: 100 STRIP | Refills: 3 | Status: SHIPPED | OUTPATIENT
Start: 2021-11-01

## 2021-11-01 NOTE — PROGRESS NOTES
HPI:   Rayna Birch is a 66year old male who presents for a Medicare Subsequent Annual Wellness visit (Pt already had Initial Annual Wellness).         His last annual assessment has been over 1 year: Annual Physical Never done         He has been sc 215 (H) 05/14/2021          Last Chemistry Labs:   Lab Results   Component Value Date    AST 22 05/14/2021    ALT 31 05/14/2021    CA 9.3 05/14/2021    ALB 4.0 05/14/2021    CREATSERUM 0.93 05/14/2021     (H) 05/14/2021        CBC  (most recent labs lesions  EYES: denies blurred vision or double vision  HEENT: denies nasal congestion, sinus pain or ST  LUNGS: denies shortness of breath with exertion  CARDIOVASCULAR: denies chest pain on exertion  GI: denies abdominal pain, denies heartburn; no hematah Genitalia: Done 6mos ago   Rectal: Done 6mos ago   Extremities: Extremities normal, atraumatic, no cyanosis or edema   Pulses: 2+ and symmetric; monofilament testing normal both feet   Skin: Skin color, texture, turgor normal, no rashes or lesions   Lymp TESTOSTERONE TOTAL        Pt complained of ED having erection for several months; he had not tried any med. We will check his testosterone.           Diet assessment: good     PLAN:  The patient indicates understanding of these issues and agrees to the plan Ultrasound Screening for Abdominal Aortic Aneurysm (AAA) Covered once in a lifetime for one of the following risk factors   • Men who are 73-68 years old and have ever smoked   • Anyone with a family history -     Colorectal Cancer Screening  Covered for a Dilated Eye Exam Annually 04/14/2020       Annual Monitoring of Persistent Medications (ACE/ARB, digoxin diuretics, anticonvulsants)    Potassium Annually Lab Results   Component Value Date    K 4.0 05/14/2021         Creatinine   Annually Lab Result

## 2021-11-11 ENCOUNTER — MED REC SCAN ONLY (OUTPATIENT)
Dept: INTERNAL MEDICINE CLINIC | Facility: CLINIC | Age: 71
End: 2021-11-11

## 2021-11-11 ENCOUNTER — TELEPHONE (OUTPATIENT)
Dept: INTERNAL MEDICINE CLINIC | Facility: CLINIC | Age: 71
End: 2021-11-11

## 2021-11-11 NOTE — TELEPHONE ENCOUNTER
Diabetic detailed written order for supplies received, signed, and faxed to [facility] at [phone], confirmation received.

## 2021-11-16 NOTE — TELEPHONE ENCOUNTER
Diabetic detailed written order signed and faxed to [facility] at [phone], confirmation received.

## 2021-12-27 RX ORDER — BLOOD-GLUCOSE METER
EACH MISCELLANEOUS
Qty: 1 KIT | Refills: 0 | Status: SHIPPED | OUTPATIENT
Start: 2021-12-27

## 2022-02-01 RX ORDER — ROSUVASTATIN CALCIUM 10 MG/1
TABLET, COATED ORAL
Qty: 90 TABLET | Refills: 1 | Status: SHIPPED | OUTPATIENT
Start: 2022-02-01

## 2022-02-01 RX ORDER — ROSUVASTATIN CALCIUM 10 MG/1
TABLET, COATED ORAL
Qty: 90 TABLET | Refills: 1 | OUTPATIENT
Start: 2022-02-01

## 2022-02-01 RX ORDER — RAMIPRIL 5 MG/1
CAPSULE ORAL
Qty: 90 CAPSULE | Refills: 1 | Status: SHIPPED | OUTPATIENT
Start: 2022-02-01

## 2022-02-01 NOTE — TELEPHONE ENCOUNTER
Refill passed per [facility] protocol.   Requested Prescriptions   Pending Prescriptions Disp Refills    GLIMEPIRIDE 2 MG Oral Tab [Pharmacy Med Name: GLIMEPIRIDE 2MG TABLETS] 90 tablet 1     Sig: TAKE 1 TABLET(2 MG) BY MOUTH EVERY MORNING BEFORE BREAKFAST        Diabetes Medication Protocol Failed - 2/1/2022  1:36 PM        Failed - Last A1C < 7.5 and within past 6 months     Lab Results   Component Value Date    A1C 6.9 (A) 05/14/2021               Passed - Appointment in past 6 or next 3 months        Passed - GFR Non- > 50     Lab Results   Component Value Date    GFRNAA 82 05/14/2021                 Passed - GFR in the past 12 months           METFORMIN HCL 1000 MG Oral Tab [Pharmacy Med Name: METFORMIN 1000MG TABLETS] 90 tablet 1     Sig: TAKE 1 TABLET(1000 MG) BY MOUTH EVERY NIGHT        Diabetes Medication Protocol Failed - 2/1/2022  1:36 PM        Failed - Last A1C < 7.5 and within past 6 months     Lab Results   Component Value Date    A1C 6.9 (A) 05/14/2021               Passed - Appointment in past 6 or next 3 months        Passed - GFR Non- > 50     Lab Results   Component Value Date    GFRNAA 82 05/14/2021                 Passed - GFR in the past 12 months           RAMIPRIL 5 MG Oral Cap [Pharmacy Med Name: RAMIPRIL 5MG CAPSULES] 90 capsule 1     Sig: TAKE 1 CAPSULE(5 MG) BY MOUTH DAILY        Hypertensive Medications Protocol Passed - 2/1/2022  1:36 PM        Passed - CMP or BMP in past 12 months        Passed - Appointment in past 6 or next 3 months        Passed - GFR Non- > 50     Lab Results   Component Value Date    GFRNAA 82 05/14/2021                    ROSUVASTATIN 10 MG Oral Tab [Pharmacy Med Name: ROSUVASTATIN 10MG TABLETS] 90 tablet 1     Sig: TAKE 1 TABLET(10 MG) BY MOUTH EVERY NIGHT        Cholesterol Medication Protocol Passed - 2/1/2022  1:36 PM        Passed - ALT in past 12 months        Passed - LDL in past 12 months        Passed - Last ALT < 80       Lab Results   Component Value Date    ALT 31 05/14/2021             Passed - Last LDL < 130     Lab Results   Component Value Date    LDL 67 05/14/2021               Passed - Appointment in past 12 or next 3 months           ROSUVASTATIN 10 MG Oral Tab [Pharmacy Med Name: ROSUVASTATIN 10MG TABLETS] 90 tablet 1     Sig: TAKE 1 TABLET(10 MG) BY MOUTH EVERY NIGHT        Cholesterol Medication Protocol Passed - 2/1/2022  1:36 PM        Passed - ALT in past 12 months        Passed - LDL in past 12 months        Passed - Last ALT < 80       Lab Results   Component Value Date    ALT 31 05/14/2021             Passed - Last LDL < 130     Lab Results   Component Value Date    LDL 67 05/14/2021               Passed - Appointment in past 12 or next 3 months             Recent Outpatient Visits              3 months ago Medicare annual wellness visit, [facility]    Office Visit    4 months ago Positive FIT (fecal immunochemical test)    [facility], [address] [place] [address], Quang, Severo Lust E, APRN    Office Visit    8 months ago Uncontrolled type 2 diabetes mellitus with hyperglycemia [facility])    [facility]    Office Visit    1 year ago Uncontrolled type 2 diabetes mellitus with hyperglycemia [facility]    [facility]    Office Visit    1 year ago Uncontrolled type 2 diabetes mellitus with hyperglycemia [facility])    [facility]    [facility] Visit

## 2022-02-01 NOTE — TELEPHONE ENCOUNTER
Please review. Protocol failed / No protocol.    Requested Prescriptions   Pending Prescriptions Disp Refills    GLIMEPIRIDE 2 MG Oral Tab [Pharmacy Med Name: GLIMEPIRIDE 2MG TABLETS] 90 tablet 1     Sig: TAKE 1 TABLET(2 MG) BY MOUTH EVERY MORNING BEFORE BREAKFAST        Diabetes Medication Protocol Failed - 2/1/2022  1:36 PM        Failed - Last A1C < 7.5 and within past 6 months     Lab Results   Component Value Date    A1C 6.9 (A) 05/14/2021               Passed - Appointment in past 6 or next 3 months        Passed - GFR Non- > 50     Lab Results   Component Value Date    GFRNAA 82 05/14/2021                 Passed - GFR in the past 12 months           METFORMIN HCL 1000 MG Oral Tab [Pharmacy Med Name: METFORMIN 1000MG TABLETS] 90 tablet 1     Sig: TAKE 1 TABLET(1000 MG) BY MOUTH EVERY NIGHT        Diabetes Medication Protocol Failed - 2/1/2022  1:36 PM        Failed - Last A1C < 7.5 and within past 6 months     Lab Results   Component Value Date    A1C 6.9 (A) 05/14/2021               Passed - Appointment in past 6 or next 3 months        Passed - GFR Non- > 50     Lab Results   Component Value Date    GFRNAA 82 05/14/2021                 Passed - GFR in the past 12 months          Signed Prescriptions Disp Refills    RAMIPRIL 5 MG Oral Cap 90 capsule 1     Sig: TAKE 1 CAPSULE(5 MG) BY MOUTH DAILY        Hypertensive Medications Protocol Passed - 2/1/2022  1:36 PM        Passed - CMP or BMP in past 12 months        Passed - Appointment in past 6 or next 3 months        Passed - GFR Non- > 50     Lab Results   Component Value Date    GFRNAA 82 05/14/2021                    ROSUVASTATIN 10 MG Oral Tab 90 tablet 1     Sig: TAKE 1 TABLET(10 MG) BY MOUTH EVERY NIGHT        Cholesterol Medication Protocol Passed - 2/1/2022  1:36 PM        Passed - ALT in past 12 months        Passed - LDL in past 12 months        Passed - Last ALT < 80       Lab Results   Component Value Date    ALT 31 2021             Passed - Last LDL < 130     Lab Results   Component Value Date    LDL 67 2021               Passed - Appointment in past 12 or next 3 months          Refused Prescriptions Disp Refills    ROSUVASTATIN 10 MG Oral Tab [Pharmacy Med Name: ROSUVASTATIN 10MG TABLETS] 90 tablet 1     Sig: TAKE 1 TABLET(10 MG) BY MOUTH EVERY NIGHT        Cholesterol Medication Protocol Passed - 2022  1:36 PM        Passed - ALT in past 12 months        Passed - LDL in past 12 months        Passed - Last ALT < 80       Lab Results   Component Value Date    ALT 31 2021             Passed - Last LDL < 130     Lab Results   Component Value Date    LDL 67 2021               Passed - Appointment in past 12 or next 3 months                Recent Outpatient Visits              3 months ago Medicare annual wellness visit, subsequent    Iva Guy Remer Natal, MD    Office Visit    4 months ago Positive FIT (fecal immunochemical test)    Casi Rueda, [facility], Anatoliy Del Rio APRN    Office Visit    8 months ago Uncontrolled type 2 diabetes mellitus with hyperglycemia [facility])    Iva Guy Remer Natal, MD    Office Visit    1 year ago Uncontrolled type 2 diabetes mellitus with hyperglycemia [facility])    Iva Guy Remer Natal, MD    Office Visit    1 year ago Uncontrolled type 2 diabetes mellitus with hyperglycemia [facility])    Iva Guy Remer Natal, MD    Office Visit

## 2022-02-02 RX ORDER — GLIMEPIRIDE 2 MG/1
TABLET ORAL
Qty: 90 TABLET | Refills: 1 | Status: SHIPPED | OUTPATIENT
Start: 2022-02-02

## 2022-09-20 ENCOUNTER — OFFICE VISIT (OUTPATIENT)
Dept: INTERNAL MEDICINE CLINIC | Facility: CLINIC | Age: 72
End: 2022-09-20

## 2022-09-20 VITALS
HEIGHT: 65 IN | TEMPERATURE: 98 F | SYSTOLIC BLOOD PRESSURE: 120 MMHG | OXYGEN SATURATION: 98 % | HEART RATE: 56 BPM | DIASTOLIC BLOOD PRESSURE: 70 MMHG | WEIGHT: 179.31 LBS | BODY MASS INDEX: 29.87 KG/M2

## 2022-09-20 DIAGNOSIS — E11.69 HYPERLIPIDEMIA ASSOCIATED WITH TYPE 2 DIABETES MELLITUS (HCC): ICD-10-CM

## 2022-09-20 DIAGNOSIS — I15.2 HYPERTENSION ASSOCIATED WITH TYPE 2 DIABETES MELLITUS (HCC): ICD-10-CM

## 2022-09-20 DIAGNOSIS — E11.59 HYPERTENSION ASSOCIATED WITH TYPE 2 DIABETES MELLITUS (HCC): ICD-10-CM

## 2022-09-20 DIAGNOSIS — E11.9 CONTROLLED TYPE 2 DIABETES MELLITUS WITHOUT COMPLICATION, WITHOUT LONG-TERM CURRENT USE OF INSULIN (HCC): Primary | ICD-10-CM

## 2022-09-20 DIAGNOSIS — Z12.5 PROSTATE CANCER SCREENING: ICD-10-CM

## 2022-09-20 DIAGNOSIS — Z13.6 SCREENING FOR HEART DISEASE: ICD-10-CM

## 2022-09-20 DIAGNOSIS — E78.5 HYPERLIPIDEMIA ASSOCIATED WITH TYPE 2 DIABETES MELLITUS (HCC): ICD-10-CM

## 2022-09-20 DIAGNOSIS — N52.9 ERECTILE DYSFUNCTION, UNSPECIFIED ERECTILE DYSFUNCTION TYPE: ICD-10-CM

## 2022-09-20 LAB
CARTRIDGE EXPIRATION DATE: ABNORMAL DATE
CARTRIDGE LOT#: ABNORMAL NUMERIC
HEMOGLOBIN A1C: 5.9 % (ref 4.3–5.6)

## 2022-09-20 PROCEDURE — 83036 HEMOGLOBIN GLYCOSYLATED A1C: CPT | Performed by: INTERNAL MEDICINE

## 2022-09-20 PROCEDURE — 99214 OFFICE O/P EST MOD 30 MIN: CPT | Performed by: INTERNAL MEDICINE

## 2022-09-20 PROCEDURE — 1126F AMNT PAIN NOTED NONE PRSNT: CPT | Performed by: INTERNAL MEDICINE

## 2022-10-25 RX ORDER — BLOOD SUGAR DIAGNOSTIC
STRIP MISCELLANEOUS
Qty: 100 STRIP | Refills: 3 | Status: SHIPPED | OUTPATIENT
Start: 2022-10-25

## 2022-10-25 NOTE — TELEPHONE ENCOUNTER
Refill passed per [place] [facility].   Requested Prescriptions   Pending Prescriptions Disp Refills    Glucose Blood (ACCU-CHEK DOMINIQUE PLUS) In Vitro Strip 100 strip 3     Sig: Test once daily        Diabetic Supplies Protocol Passed - 10/24/2022  1:45 PM        Passed - In person appointment or virtual visit in the past 12 mos or appointment in next 3 mos       Recent Outpatient Visits              1 month ago Controlled type 2 diabetes mellitus without complication, without long-term current use of insulin [facility])    [facility], Cedric Pelayo MD    Office Visit    11 months ago [facility] annual wellness visit, subsequent    [facility]    Office Visit    1 year ago Positive FIT (fecal immunochemical test)    [facility], [address] [place] [address], Roxie Del Rio APRN    Office Visit    1 year ago Uncontrolled type 2 diabetes mellitus with hyperglycemia [facility])    [facility]    Office Visit    2 years ago Uncontrolled type 2 diabetes mellitus with hyperglycemia [facility])    [facility]    Office Visit     Future Appointments         Provider Department Appt Notes    In 1 week Hossein Curtis MD [facility], Alexey Pelayo                   Recent Outpatient Visits              1 month ago Controlled type 2 diabetes mellitus without complication, without long-term current use of insulin [facility])    [facility]    Office Visit    11 months ago Medicare annual wellness visit, subsequent    [facility]    Office Visit    1 year ago Positive FIT (fecal immunochemical test)    [facility], [address] [place], [place] [address]Quang Bretta Guan, APRN    Office Visit    1 year ago Uncontrolled type 2 diabetes mellitus with hyperglycemia [facility])    [address], Alexey Raphael MD    Office Visit    2 years ago Uncontrolled type 2 diabetes mellitus with hyperglycemia [facility])    [address], Tacho Ragsdale MD    Office Visit          Future Appointments         Provider Department Appt Notes    In 1 week Demetrice Raphael MD [address], Alexey schumacher

## 2022-10-27 ENCOUNTER — LAB ENCOUNTER (OUTPATIENT)
Dept: LAB | Age: 72
End: 2022-10-27
Attending: INTERNAL MEDICINE
Payer: MEDICARE

## 2022-10-27 DIAGNOSIS — I15.2 HYPERTENSION ASSOCIATED WITH TYPE 2 DIABETES MELLITUS (HCC): ICD-10-CM

## 2022-10-27 DIAGNOSIS — N52.9 ERECTILE DYSFUNCTION, UNSPECIFIED ERECTILE DYSFUNCTION TYPE: ICD-10-CM

## 2022-10-27 DIAGNOSIS — E11.9 CONTROLLED TYPE 2 DIABETES MELLITUS WITHOUT COMPLICATION, WITHOUT LONG-TERM CURRENT USE OF INSULIN (HCC): ICD-10-CM

## 2022-10-27 DIAGNOSIS — E11.59 HYPERTENSION ASSOCIATED WITH TYPE 2 DIABETES MELLITUS (HCC): ICD-10-CM

## 2022-10-27 DIAGNOSIS — E11.69 HYPERLIPIDEMIA ASSOCIATED WITH TYPE 2 DIABETES MELLITUS (HCC): ICD-10-CM

## 2022-10-27 DIAGNOSIS — E78.5 HYPERLIPIDEMIA ASSOCIATED WITH TYPE 2 DIABETES MELLITUS (HCC): ICD-10-CM

## 2022-10-27 DIAGNOSIS — Z12.5 PROSTATE CANCER SCREENING: ICD-10-CM

## 2022-10-27 DIAGNOSIS — Z00.00 MEDICARE ANNUAL WELLNESS VISIT, SUBSEQUENT: ICD-10-CM

## 2022-10-27 LAB
ALBUMIN SERPL-MCNC: 4 G/DL (ref 3.4–5)
ALBUMIN/GLOB SERPL: 1.3 {RATIO} (ref 1–2)
ALP LIVER SERPL-CCNC: 63 U/L
ALT SERPL-CCNC: 26 U/L
ANION GAP SERPL CALC-SCNC: 6 MMOL/L (ref 0–18)
AST SERPL-CCNC: 15 U/L (ref 15–37)
BASOPHILS # BLD AUTO: 0.05 X10(3) UL (ref 0–0.2)
BASOPHILS NFR BLD AUTO: 0.9 %
BILIRUB SERPL-MCNC: 0.5 MG/DL (ref 0.1–2)
BUN BLD-MCNC: 20 MG/DL (ref 7–18)
BUN/CREAT SERPL: 19 (ref 10–20)
CALCIUM BLD-MCNC: 9.1 MG/DL (ref 8.5–10.1)
CHLORIDE SERPL-SCNC: 104 MMOL/L (ref 98–112)
CHOLEST SERPL-MCNC: 104 MG/DL (ref ?–200)
CO2 SERPL-SCNC: 29 MMOL/L (ref 21–32)
COMPLEXED PSA SERPL-MCNC: 1.15 NG/ML (ref ?–4)
CREAT BLD-MCNC: 1.05 MG/DL
CREAT UR-SCNC: 110 MG/DL
DEPRECATED RDW RBC AUTO: 42.5 FL (ref 35.1–46.3)
EOSINOPHIL # BLD AUTO: 0.06 X10(3) UL (ref 0–0.7)
EOSINOPHIL NFR BLD AUTO: 1.1 %
ERYTHROCYTE [DISTWIDTH] IN BLOOD BY AUTOMATED COUNT: 12.1 % (ref 11–15)
FASTING PATIENT LIPID ANSWER: YES
FASTING STATUS PATIENT QL REPORTED: YES
GFR SERPLBLD BASED ON 1.73 SQ M-ARVRAT: 75 ML/MIN/1.73M2 (ref 60–?)
GLOBULIN PLAS-MCNC: 3.2 G/DL (ref 2.8–4.4)
GLUCOSE BLD-MCNC: 111 MG/DL (ref 70–99)
HCT VFR BLD AUTO: 43.6 %
HDLC SERPL-MCNC: 50 MG/DL (ref 40–59)
HGB BLD-MCNC: 15 G/DL
IMM GRANULOCYTES # BLD AUTO: 0.01 X10(3) UL (ref 0–1)
IMM GRANULOCYTES NFR BLD: 0.2 %
LDLC SERPL CALC-MCNC: 33 MG/DL (ref ?–100)
LYMPHOCYTES # BLD AUTO: 2.71 X10(3) UL (ref 1–4)
LYMPHOCYTES NFR BLD AUTO: 49.5 %
MCH RBC QN AUTO: 32.8 PG (ref 26–34)
MCHC RBC AUTO-ENTMCNC: 34.4 G/DL (ref 31–37)
MCV RBC AUTO: 95.2 FL
MICROALBUMIN UR-MCNC: 1.01 MG/DL
MICROALBUMIN/CREAT 24H UR-RTO: 9.2 UG/MG (ref ?–30)
MONOCYTES # BLD AUTO: 0.42 X10(3) UL (ref 0.1–1)
MONOCYTES NFR BLD AUTO: 7.7 %
NEUTROPHILS # BLD AUTO: 2.23 X10 (3) UL (ref 1.5–7.7)
NEUTROPHILS # BLD AUTO: 2.23 X10(3) UL (ref 1.5–7.7)
NEUTROPHILS NFR BLD AUTO: 40.6 %
NONHDLC SERPL-MCNC: 54 MG/DL (ref ?–130)
OSMOLALITY SERPL CALC.SUM OF ELEC: 291 MOSM/KG (ref 275–295)
PLATELET # BLD AUTO: 174 10(3)UL (ref 150–450)
POTASSIUM SERPL-SCNC: 4.2 MMOL/L (ref 3.5–5.1)
PROT SERPL-MCNC: 7.2 G/DL (ref 6.4–8.2)
RBC # BLD AUTO: 4.58 X10(6)UL
SODIUM SERPL-SCNC: 139 MMOL/L (ref 136–145)
TESTOST SERPL-MCNC: 462.28 NG/DL
TRIGL SERPL-MCNC: 119 MG/DL (ref 30–149)
VLDLC SERPL CALC-MCNC: 16 MG/DL (ref 0–30)
WBC # BLD AUTO: 5.5 X10(3) UL (ref 4–11)

## 2022-10-27 PROCEDURE — 80061 LIPID PANEL: CPT

## 2022-10-27 PROCEDURE — 84403 ASSAY OF TOTAL TESTOSTERONE: CPT

## 2022-10-27 PROCEDURE — 85025 COMPLETE CBC W/AUTO DIFF WBC: CPT

## 2022-10-27 PROCEDURE — 82043 UR ALBUMIN QUANTITATIVE: CPT

## 2022-10-27 PROCEDURE — 36415 COLL VENOUS BLD VENIPUNCTURE: CPT

## 2022-10-27 PROCEDURE — 82570 ASSAY OF URINE CREATININE: CPT

## 2022-10-27 PROCEDURE — 80053 COMPREHEN METABOLIC PANEL: CPT

## 2022-11-02 ENCOUNTER — TELEPHONE (OUTPATIENT)
Dept: INTERNAL MEDICINE CLINIC | Facility: CLINIC | Age: 72
End: 2022-11-02

## 2022-11-02 ENCOUNTER — OFFICE VISIT (OUTPATIENT)
Dept: INTERNAL MEDICINE CLINIC | Facility: CLINIC | Age: 72
End: 2022-11-02
Payer: MEDICARE

## 2022-11-02 VITALS
DIASTOLIC BLOOD PRESSURE: 73 MMHG | HEART RATE: 53 BPM | TEMPERATURE: 97 F | HEIGHT: 65 IN | WEIGHT: 178.31 LBS | OXYGEN SATURATION: 97 % | SYSTOLIC BLOOD PRESSURE: 127 MMHG | BODY MASS INDEX: 29.71 KG/M2

## 2022-11-02 DIAGNOSIS — E11.69 HYPERLIPIDEMIA ASSOCIATED WITH TYPE 2 DIABETES MELLITUS (HCC): ICD-10-CM

## 2022-11-02 DIAGNOSIS — Z13.6 SCREENING FOR AAA (ABDOMINAL AORTIC ANEURYSM): ICD-10-CM

## 2022-11-02 DIAGNOSIS — E11.9 DIABETIC EYE EXAM (HCC): ICD-10-CM

## 2022-11-02 DIAGNOSIS — Z12.5 PROSTATE CANCER SCREENING: ICD-10-CM

## 2022-11-02 DIAGNOSIS — N52.9 ERECTILE DYSFUNCTION, UNSPECIFIED ERECTILE DYSFUNCTION TYPE: ICD-10-CM

## 2022-11-02 DIAGNOSIS — Z01.00 DIABETIC EYE EXAM (HCC): ICD-10-CM

## 2022-11-02 DIAGNOSIS — I15.2 HYPERTENSION ASSOCIATED WITH TYPE 2 DIABETES MELLITUS (HCC): ICD-10-CM

## 2022-11-02 DIAGNOSIS — E78.5 HYPERLIPIDEMIA ASSOCIATED WITH TYPE 2 DIABETES MELLITUS (HCC): ICD-10-CM

## 2022-11-02 DIAGNOSIS — E11.59 HYPERTENSION ASSOCIATED WITH TYPE 2 DIABETES MELLITUS (HCC): ICD-10-CM

## 2022-11-02 DIAGNOSIS — E11.9 CONTROLLED TYPE 2 DIABETES MELLITUS WITHOUT COMPLICATION, WITHOUT LONG-TERM CURRENT USE OF INSULIN (HCC): ICD-10-CM

## 2022-11-02 DIAGNOSIS — Z12.11 COLON CANCER SCREENING: ICD-10-CM

## 2022-11-02 DIAGNOSIS — Z00.00 MEDICARE ANNUAL WELLNESS VISIT, SUBSEQUENT: Primary | ICD-10-CM

## 2022-11-02 DIAGNOSIS — Z13.6 SCREENING FOR HEART DISEASE: ICD-10-CM

## 2022-11-02 PROCEDURE — G0439 PPPS, SUBSEQ VISIT: HCPCS | Performed by: INTERNAL MEDICINE

## 2022-11-02 PROCEDURE — G0008 ADMIN INFLUENZA VIRUS VAC: HCPCS | Performed by: INTERNAL MEDICINE

## 2022-11-02 PROCEDURE — 90662 IIV NO PRSV INCREASED AG IM: CPT | Performed by: INTERNAL MEDICINE

## 2022-11-02 PROCEDURE — 1125F AMNT PAIN NOTED PAIN PRSNT: CPT | Performed by: INTERNAL MEDICINE

## 2022-11-02 RX ORDER — RAMIPRIL 5 MG/1
5 CAPSULE ORAL DAILY
Qty: 90 CAPSULE | Refills: 1 | Status: SHIPPED | OUTPATIENT
Start: 2022-11-02

## 2022-11-02 RX ORDER — ROSUVASTATIN CALCIUM 10 MG/1
10 TABLET, COATED ORAL NIGHTLY
Qty: 90 TABLET | Refills: 1 | Status: SHIPPED | OUTPATIENT
Start: 2022-11-02

## 2022-11-02 NOTE — TELEPHONE ENCOUNTER
Pt was seen today in clinic and dr told him that he will send 4 Rx's to the pharm but the pharm only received 3 Rx's. Pts son states that the pt is out of his med and is requesting to get a new Rx for Glimepiride sent to [facility] on [address]. pts son requesting to get a call back to confirm that Rx was sent.

## 2023-02-07 ENCOUNTER — TELEPHONE (OUTPATIENT)
Dept: INTERNAL MEDICINE CLINIC | Facility: CLINIC | Age: 73
End: 2023-02-07

## 2023-02-07 ENCOUNTER — MED REC SCAN ONLY (OUTPATIENT)
Dept: INTERNAL MEDICINE CLINIC | Facility: CLINIC | Age: 73
End: 2023-02-07

## 2023-02-07 NOTE — TELEPHONE ENCOUNTER
Diabetic detailed written order received from [place], placed on Dr Khadijah Nelson desla nena for review and signature.

## 2023-02-09 NOTE — TELEPHONE ENCOUNTER
Diabetic detailed written order signed and faxed to [facility] at [phone], confirmation received.

## 2023-04-06 ENCOUNTER — TELEPHONE (OUTPATIENT)
Dept: INTERNAL MEDICINE CLINIC | Facility: CLINIC | Age: 73
End: 2023-04-06

## 2023-04-21 NOTE — TELEPHONE ENCOUNTER
Please review. Protocol failed / No protocol. Requested Prescriptions   Pending Prescriptions Disp Refills    metFORMIN HCl 1000 MG Oral Tab 90 tablet 3     Sig: Take 1 tablet (1,000 mg total) by mouth nightly.        Diabetes Medication Protocol Failed - 4/21/2023  9:36 AM        Failed - Last A1C < 7.5 and within past 6 months     Lab Results   Component Value Date    A1C 5.9 (A) 09/20/2022               Passed - In person appointment or virtual visit in the past 6 mos or appointment in next 3 mos     Recent Outpatient Visits              5 months ago Medicare annual wellness visit, subsequent    Cedric Ludwig MD    Office Visit    7 months ago Controlled type 2 diabetes mellitus without complication, without long-term current use of insulin [facility]    Iva Muñoz Darci Baize, MD    Office Visit    1 year ago Medicare annual wellness visit, subsequent    Cedric Ludwig MD    Office Visit    1 year ago Positive FIT (fecal immunochemical test)    [facility], [address] [place] [address], La VerniaRoxie APRN    Office Visit    1 year ago Uncontrolled type 2 diabetes mellitus with hyperglycemia [facility])    Iva Muñoz Darci Baize, MD    Office Visit                      Passed - EGFRCR or GFRNAA > 50     GFR Evaluation  EGFRCR: 75 , resulted on 10/27/2022            Passed - GFR in the past 12 months               Recent Outpatient Visits              5 months ago Medicare annual wellness visit, subsequent    Iva Muñoz Darci Baize, MD    Office Visit    7 months ago Controlled type 2 diabetes mellitus without complication, without long-term current use of insulin [facility])    Iva Muñoz, Neha Kline Kaitlin Mcnulty MD    Office Visit    1 year ago Medicare annual wellness visit, subsequent    [address], Deann Esteves MD    Office Visit    1 year ago Positive FIT (fecal immunochemical test)    [facility], [address] [place] [address], FlintBritta, CHRISTINA    Office Visit    1 year ago Uncontrolled type 2 diabetes mellitus with hyperglycemia [facility])    [address] [place], Deann Esteves MD    Office Visit

## 2023-05-15 ENCOUNTER — TELEPHONE (OUTPATIENT)
Dept: INTERNAL MEDICINE CLINIC | Facility: CLINIC | Age: 73
End: 2023-05-15

## 2023-05-15 NOTE — TELEPHONE ENCOUNTER
juventino diabetic detailed written order glucose test strips placed on Dr. Karle Meckel desla nena for signature and review.

## 2023-05-16 ENCOUNTER — MED REC SCAN ONLY (OUTPATIENT)
Dept: INTERNAL MEDICINE CLINIC | Facility: CLINIC | Age: 73
End: 2023-05-16

## 2023-07-20 RX ORDER — RAMIPRIL 5 MG/1
5 CAPSULE ORAL DAILY
Qty: 90 CAPSULE | Refills: 1 | Status: SHIPPED | OUTPATIENT
Start: 2023-07-20

## 2023-07-20 NOTE — TELEPHONE ENCOUNTER
Please review. Protocol failed / No protocol. Requested Prescriptions   Pending Prescriptions Disp Refills    ramipril 5 MG Oral Cap 90 capsule 1     Sig: Take 1 capsule (5 mg total) by mouth daily. Hypertensive Medications Protocol Failed - 7/20/2023  4:38 PM        Failed - CMP or BMP in past 6 months     No results found for this or any previous visit (from the past 4392 hour(s)).             Failed - In person appointment or virtual visit in the past 6 months     Recent Outpatient Visits              8 months ago Medicare annual wellness visit, subsequent    [facility] [address] Gurvinder Carranza MD    Office Visit    10 months ago Controlled type 2 diabetes mellitus without complication, without long-term current use of insulin ([address]    Iva Lamb Tedi Camel, MD    Office Visit    1 year ago Medicare annual wellness visit, subsequent    [facility] [address] Gurvinder Hutchinson MD    Office Visit    1 year ago Positive FIT (fecal immunochemical test)    [facility], [address] [place] [address], Jeyson Del Rio, APRN    Office Visit    2 years ago Uncontrolled type 2 diabetes mellitus with hyperglycemia [facility])    Iva Lamb Tedi Camel, MD    Office Visit          Future Appointments         Provider Department Appt Notes    In 3 months Greg Ragsdale MD [facility] [address] [place], Alexey GALLOWAY               Passed - In person appointment in the past 12 or next 3 months     Recent Outpatient Visits              8 months ago Medicare annual wellness visit, subsequent    [facility] [address] Gurvinder Carranza MD    Office Visit    10 months ago Controlled type 2 diabetes mellitus without complication, without long-term current use of insulin ([address]    [facility], [address], Isabel Church MD    Office Visit    1 year ago Medicare annual wellness visit, subsequent    [address], Isabel Church MD    Office Visit    1 year ago Positive FIT (fecal immunochemical test)    [facility], [address] [facility], ArnettLonny, CHRISTINA    Office Visit    2 years ago Uncontrolled type 2 diabetes mellitus with hyperglycemia [facility])    [address], [address], Isabel Church MD    Office Visit          Future Appointments         Provider Department Appt Notes    In 3 months Alejandro Crowe MD [address], [address], [place] [facility]               Passed - Last BP reading less than 140/90     BP Readings from Last 1 Encounters:  22 : 127/73              Passed - EGFRCR or GFRNAA > 50     GFR Evaluation  EGFRCR: 75 , resulted on 10/27/2022                Recent Outpatient Visits              8 months ago Medicare annual wellness visit, subsequent    [address], Isabel Church MD    Office Visit    10 months ago Controlled type 2 diabetes mellitus without complication, without long-term current use of insulin ([address]    [address], [address], Isabel Church MD    Office Visit    1 year ago Medicare annual wellness visit, subsequent    [address], Isabel Church MD    Office Visit    1 year ago Positive FIT (fecal immunochemical test)    [address], [address] [facility], Lonny Del Rio APRN    Office Visit    2 years ago Uncontrolled type 2 diabetes mellitus with hyperglycemia [facility])    [address], Isabel Church MD    Office Visit             Future Appointments         Provider Department Appt Notes    In 3 months Saundra Link, MD Jason Morataya [facility]

## 2023-07-20 NOTE — TELEPHONE ENCOUNTER
Pt requesting refill for the following medication.         ramipril 5 MG Oral Cap, Take 1 capsule (5 mg total) by mouth in the morning., Disp: 90 capsule, Rfl: 1

## 2023-07-21 NOTE — TELEPHONE ENCOUNTER
Please review refill protocol failed/ no protocol  Requested Prescriptions   Pending Prescriptions Disp Refills    METFORMIN HCL 1000 MG Oral Tab [Pharmacy Med Name: METFORMIN 1000MG TABLETS] 90 tablet 0     Sig: TAKE 1 TABLET(1000 MG) BY MOUTH EVERY NIGHT       Diabetes Medication Protocol Failed - 7/20/2023  6:07 AM        Failed - Last A1C < 7.5 and within past 6 months     Lab Results   Component Value Date    A1C 5.9 (A) 09/20/2022             Failed - In person appointment or virtual visit in the past 6 mos or appointment in next 3 mos     Recent Outpatient Visits              8 months ago Medicare annual wellness visit, subsequent    [place]    Office Visit    10 months ago Controlled type 2 diabetes mellitus without complication, without long-term current use of insulin ([facility]    [place] [address], Angel Gomes MD    Office Visit    1 year ago Medicare annual wellness visit, subsequent    Job[place]  Angel Hutchinson MD    Office Visit    1 year ago Positive FIT (fecal immunochemical test)    [facility], [address] [place] [address], [facility] E, APRN    Office Visit    2 years ago Uncontrolled type 2 diabetes mellitus with hyperglycemia [facility])    Joby  Angel Hutchinson MD    Office Visit          Future Appointments         Provider Department Appt Notes    In 3 months Oralia Zaragoza MD [address] [place] [address] Alexey Carranza               Passed - EGFRCR or GFRNAA > 50     GFR Evaluation  EGFRCR: 75 , resulted on 10/27/2022          Passed - GFR in the past 12 months

## 2023-07-24 NOTE — TELEPHONE ENCOUNTER
Pharmacy requesting refill      rosuvastatin 10 MG Oral Tab, Take 1 tablet (10 mg total) by mouth nightly., Disp: 90 tablet, Rfl: 1

## 2023-07-25 RX ORDER — ROSUVASTATIN CALCIUM 10 MG/1
10 TABLET, COATED ORAL NIGHTLY
Qty: 90 TABLET | Refills: 3 | Status: SHIPPED | OUTPATIENT
Start: 2023-07-25

## 2023-07-25 NOTE — TELEPHONE ENCOUNTER
Refill passed per [place] [employer] [facility] protocol. Requested Prescriptions   Pending Prescriptions Disp Refills    rosuvastatin 10 MG Oral Tab 90 tablet 1     Sig: Take 1 tablet (10 mg total) by mouth nightly.        Cholesterol Medication Protocol Passed - 7/24/2023  1:48 PM        Passed - ALT in past 12 months        Passed - LDL in past 12 months        Passed - Last ALT < 80     Lab Results   Component Value Date    ALT 26 10/27/2022             Passed - Last LDL < 130     Lab Results   Component Value Date    LDL 33 10/27/2022             Passed - In person appointment or virtual visit in the past 12 mos or appointment in next 3 mos     Recent Outpatient Visits              8 months ago Medicare annual wellness visit, subsequent    [address], MD    Office Visit    10 months ago Controlled type 2 diabetes mellitus without complication, without long-term current use of insulin ([facility]    [address], [address], Rolando Sierra MD    Office Visit    1 year ago Medicare annual wellness visit, subsequent    [address], MD    Office Visit    1 year ago Positive FIT (fecal immunochemical test)    [address], [address] [facility], [facility]    Office Visit    2 years ago Uncontrolled type 2 diabetes mellitus with hyperglycemia [facility])    [address], [address], Rolando Sierra MD    Office Visit          Future Appointments         Provider Department Appt Notes    In 3 months Lamar Coates MD [address], Alexey [place] [facility]                  Future Appointments         Provider Department Appt Notes    In 3 months Lamar Coates MD [address], Alexey GALLOWAY          Recent Outpatient Visits              8 months ago Medicare annual wellness visit, subsequent    [address], Hetal Campo MD    Office Visit    10 months ago Controlled type 2 diabetes mellitus without complication, without long-term current use of insulin ([facility]    [address], [facility], Hetal Campo MD    Office Visit    1 year ago Medicare annual wellness visit, subsequent    [address], Hetal Campo MD    Office Visit    1 year ago Positive FIT (fecal immunochemical test)    [facility], [address] [place] [address], APRN    Office Visit    2 years ago Uncontrolled type 2 diabetes mellitus with hyperglycemia [facility])    [address], Hetal Campo MD    Office Visit

## 2023-07-26 RX ORDER — BLOOD-GLUCOSE METER
EACH MISCELLANEOUS
Qty: 1 KIT | Refills: 0 | Status: SHIPPED | OUTPATIENT
Start: 2023-07-26

## 2023-07-26 NOTE — TELEPHONE ENCOUNTER
Refill passed per [employer] protocol.     Requested Prescriptions   Pending Prescriptions Disp Refills    ACCU-CHEK GUIDE w/Device Does not apply Kit [Pharmacy Med Name: Titus Pierce 91  0     Sig: TEST DAILY BEFORE BREAKFAST AS DIRECTED       Diabetic Supplies Protocol Passed - 7/25/2023 10:09 AM        Passed - In person appointment or virtual visit in the past 12 mos or appointment in next 3 mos     Recent Outpatient Visits              8 months ago Medicare annual wellness visit, subsequent    [address], MD    Office Visit    10 months ago Controlled type 2 diabetes mellitus without complication, without long-term current use of insulin ([facility] [address]    Iva Whalen Helena Conroy, MD    Office Visit    1 year ago Medicare annual wellness visit, subsequent    [address], MD    Office Visit    1 year ago Positive FIT (fecal immunochemical test)    [facility], [facility], [facility] [address] APRN    Office Visit    2 years ago Uncontrolled type 2 diabetes mellitus with hyperglycemia [facility])    Iva Whalen Helena Conroy, MD    Office Visit          Future Appointments         Provider Department Appt Notes    In 3 months Vipul Andrade MD [address], [place] [facility]                  Future Appointments         Provider Department Appt Notes    In 3 months Vipul Andrade MD [address], [place] [facility]          Recent Outpatient Visits              8 months ago Medicare annual wellness visit, subsequent    [address], MD    Office Visit    10 months ago Controlled type 2 diabetes mellitus without complication, without long-term current use of insulin [facility])    [address] MD    Office Visit    1 year ago Medicare annual wellness visit, subsequent    [address] MD    Office Visit    1 year ago Positive FIT (fecal immunochemical test)    [facility], [address] [place] [address], Joshua Del Rio APRN    Office Visit    2 years ago Uncontrolled type 2 diabetes mellitus with hyperglycemia [facility])    [address] Hutch[address] MD    Office Visit

## 2023-11-02 ENCOUNTER — OFFICE VISIT (OUTPATIENT)
Dept: INTERNAL MEDICINE CLINIC | Facility: CLINIC | Age: 73
End: 2023-11-02

## 2023-11-02 VITALS
DIASTOLIC BLOOD PRESSURE: 76 MMHG | WEIGHT: 188 LBS | TEMPERATURE: 98 F | BODY MASS INDEX: 31.32 KG/M2 | SYSTOLIC BLOOD PRESSURE: 126 MMHG | HEART RATE: 63 BPM | OXYGEN SATURATION: 95 % | HEIGHT: 65 IN

## 2023-11-02 DIAGNOSIS — Z01.00 DIABETIC EYE EXAM (HCC): ICD-10-CM

## 2023-11-02 DIAGNOSIS — Z13.6 SCREENING FOR AAA (ABDOMINAL AORTIC ANEURYSM): ICD-10-CM

## 2023-11-02 DIAGNOSIS — Z12.11 COLON CANCER SCREENING: ICD-10-CM

## 2023-11-02 DIAGNOSIS — I15.2 HYPERTENSION ASSOCIATED WITH TYPE 2 DIABETES MELLITUS: ICD-10-CM

## 2023-11-02 DIAGNOSIS — Z00.00 MEDICARE ANNUAL WELLNESS VISIT, SUBSEQUENT: Primary | ICD-10-CM

## 2023-11-02 DIAGNOSIS — E11.9 DIABETIC EYE EXAM (HCC): ICD-10-CM

## 2023-11-02 DIAGNOSIS — N52.9 ERECTILE DYSFUNCTION, UNSPECIFIED ERECTILE DYSFUNCTION TYPE: ICD-10-CM

## 2023-11-02 DIAGNOSIS — Z13.6 SCREENING FOR HEART DISEASE: ICD-10-CM

## 2023-11-02 DIAGNOSIS — E78.5 HYPERLIPIDEMIA ASSOCIATED WITH TYPE 2 DIABETES MELLITUS: ICD-10-CM

## 2023-11-02 DIAGNOSIS — Z12.5 PROSTATE CANCER SCREENING: ICD-10-CM

## 2023-11-02 DIAGNOSIS — Z13.6 ENCOUNTER FOR ABDOMINAL AORTIC ANEURYSM (AAA) SCREENING: ICD-10-CM

## 2023-11-02 DIAGNOSIS — E11.69 HYPERLIPIDEMIA ASSOCIATED WITH TYPE 2 DIABETES MELLITUS: ICD-10-CM

## 2023-11-02 DIAGNOSIS — E11.65 TYPE 2 DIABETES MELLITUS WITH HYPERGLYCEMIA, WITHOUT LONG-TERM CURRENT USE OF INSULIN (HCC): ICD-10-CM

## 2023-11-02 DIAGNOSIS — E11.59 HYPERTENSION ASSOCIATED WITH TYPE 2 DIABETES MELLITUS: ICD-10-CM

## 2023-11-02 LAB
CARTRIDGE LOT#: ABNORMAL NUMERIC
HEMOGLOBIN A1C: 8.6 % (ref 4.3–5.6)

## 2023-11-02 PROCEDURE — G0008 ADMIN INFLUENZA VIRUS VAC: HCPCS | Performed by: INTERNAL MEDICINE

## 2023-11-02 PROCEDURE — 1126F AMNT PAIN NOTED NONE PRSNT: CPT | Performed by: INTERNAL MEDICINE

## 2023-11-02 PROCEDURE — 90662 IIV NO PRSV INCREASED AG IM: CPT | Performed by: INTERNAL MEDICINE

## 2023-11-02 PROCEDURE — 83036 HEMOGLOBIN GLYCOSYLATED A1C: CPT | Performed by: INTERNAL MEDICINE

## 2023-11-02 PROCEDURE — G0439 PPPS, SUBSEQ VISIT: HCPCS | Performed by: INTERNAL MEDICINE

## 2023-11-02 RX ORDER — GLIMEPIRIDE 2 MG/1
2 TABLET ORAL
Qty: 90 TABLET | Refills: 0 | Status: SHIPPED | OUTPATIENT
Start: 2023-11-02

## 2024-01-17 RX ORDER — RAMIPRIL 5 MG/1
5 CAPSULE ORAL DAILY
Qty: 90 CAPSULE | Refills: 1 | Status: SHIPPED | OUTPATIENT
Start: 2024-01-17

## 2024-01-17 RX ORDER — BLOOD SUGAR DIAGNOSTIC
STRIP MISCELLANEOUS
Qty: 100 STRIP | Refills: 3 | Status: SHIPPED | OUTPATIENT
Start: 2024-01-17

## 2024-01-17 NOTE — TELEPHONE ENCOUNTER
Please review; protocol failed.    Requested Prescriptions   Pending Prescriptions Disp Refills    ramipril 5 MG Oral Cap [Pharmacy Med Name: RAMIPRIL 5MG CAPSULES] 90 capsule 1     Sig: Take 1 capsule (5 mg total) by mouth daily.       Hypertensive Medications Protocol Failed - 1/16/2024  6:13 AM        Failed - CMP or BMP in past 6 months     No results found for this or any previous visit (from the past 4392 hour(s)).            Failed - EGFRCR or GFRNAA > 50     GFR Evaluation            Passed - In person appointment in the past 12 or next 3 months     Recent Outpatient Visits              2 months ago Medicare annual wellness visit, subsequent    [facility] Du Gardner MD    Office Visit    1 year ago [facility] wellness [facility]    1 year ago Controlled type 2 diabetes mellitus without complication, without long-term current use of insulin ([facility])    [facility] Du Gardner MD    Office Visit    2 years ago Medicare annual wellness visit, subsequent    [facility] Du Gardner MD    Office Visit    2 years ago Positive FIT (fecal immunochemical test)    [facility], [facility], [place] Beverley Cavazos APRN    Office Visit                      Passed - Last BP reading less than 140/90     BP Readings from Last 1 Encounters:   11/02/23 126/76               Passed - In person appointment or virtual visit in the past 6 months     Recent Outpatient Visits              2 months ago Medicare annual wellness visit, subsequent    [facility] Du Gardner MD    Office Visit    1 year ago Medicare annual wellness visit, subsequent    [facility]  Du Gardner MD    Office Visit    1 year ago Controlled type 2 diabetes mellitus without complication, without long-term current use of insulin (HCC)    [facility]    Office Visit    2 years ago Medicare annual wellness visit, subsequent    [facility]    Office Visit    2 years ago Positive FIT (fecal immunochemical test)    [facility], Beverley Greer APRN    Office Visit                       Signed Prescriptions Disp Refills    Glucose Blood (ACCU-CHEK GUIDE) In Vitro Strip 100 strip 3     Sig: TEST ONCE DAILY       Diabetic Supplies Protocol Passed - 1/16/2024  6:13 AM        Passed - In person appointment or virtual visit in the past 12 mos or appointment in next 3 mos     Recent Outpatient Visits              2 months ago Medicare annual wellness visit, subsequent    [facility], Du Stephenson MD    Office Visit    1 year ago Medicare annual wellness visit, subsequent    [facility]    1 year ago Controlled type 2 diabetes mellitus without complication, without long-term current use of insulin ([facility])    [facility] [address], Du Stephenson MD    Office Visit    2 years ago Medicare annual wellness visit, subsequent    [facility]    [facility] Visit    2 years ago Positive FIT (fecal immunochemical test)    [facility], Beverley Greer APRN    Office Visit                               Recent Outpatient Visits              2 months ago Medicare annual wellness visit, subsequent    [facility], Du Stephenson  MD    Office Visit    1 year ago Medicare annual wellness visit, subsequent    [facility] [address], Du Stephenson MD    Office Visit    1 year ago Controlled type 2 diabetes mellitus without complication, without long-term current use of insulin (HCC)    [facility] [address], Du Stephenson MD    Office Visit    2 years ago Medicare annual wellness visit, subsequent    [facility] [address], Du Stephenson MD    Office Visit    2 years ago Positive FIT (fecal immunochemical test)    [facility], [facility] Beverley Cavazos APRN    Office Visit

## 2024-01-17 NOTE — TELEPHONE ENCOUNTER
Refill passed per [facility] protocol.    Requested Prescriptions   Pending Prescriptions Disp Refills    ACCU-CHEK GUIDE In Vitro Strip [Pharmacy Med Name: ACCU-CHEK GUIDE TEST STRIPS 100S] 100 strip 3     Sig: TEST ONCE DAILY       Diabetic Supplies Protocol Passed - 1/16/2024  6:13 AM        Passed - In person appointment or virtual visit in the past 12 mos or appointment in next 3 mos     Recent Outpatient Visits              2 months ago Medicare annual wellness visit, subsequent    [facility]    1 year ago Medicare annual wellness visit, subsequent    [facility]    [facility]    1 year ago Controlled type 2 diabetes mellitus without complication, without long-term current use of insulin ([facility])    [facility]    [facility]    2 years ago Medicare annual wellness visit, subsequent    [facility]    [facility] Visit    2 years ago Positive FIT (fecal immunochemical test)    [facility] Beverley Cavazos APRN    Office Visit                        RAMIPRIL 5 MG Oral Cap [Pharmacy Med Name: RAMIPRIL 5MG CAPSULES] 90 capsule 1     Sig: TAKE 1 CAPSULE(5 MG) BY MOUTH DAILY       Hypertensive Medications Protocol Failed - 1/16/2024  6:13 AM        Failed - CMP or BMP in past 6 months     No results found for this or any previous visit (from the past 4392 hour(s)).            Failed - EGFRCR or GFRNAA > 50     GFR Evaluation            Passed - In person appointment in the past 12 or next 3 months     Recent Outpatient Visits              2 months ago Medicare annual wellness visit, subsequent    [facility]    [facility]    1  year ago Medicare annual wellness visit, subsequent    [facility]    [facility]    1 year ago Controlled type 2 diabetes mellitus without complication, without long-term current use of insulin ([facility])    [facility] [address], Du Stephenson MD    Office Visit    2 years ago Medicare annual wellness visit, subsequent    [facility], Du Stephenson MD    Office Visit    2 years ago Positive FIT (fecal immunochemical test)    [facility], [facility], Beverley Greer, APRN    Office Visit                      Passed - Last BP reading less than 140/90     BP Readings from Last 1 Encounters:   11/02/23 126/76               Passed - In person appointment or virtual visit in the past 6 months     Recent Outpatient Visits              2 months ago Medicare annual wellness visit, subsequent    [facility], Du Stephenson MD    Office Visit    1 year ago Medicare annual wellness visit, subsequent    [facility], Du Stephenson MD    Office Visit    1 year ago Controlled type 2 diabetes mellitus without complication, without long-term current use of insulin ([facility])    [facility]    [facility] Visit    2 years ago Medicare annual wellness visit, subsequent    [facility] Du Stephenson MD    Office Visit    2 years ago Positive FIT (fecal immunochemical test)    [facility], Beverley Greer, CHRISTINA    Office Visit                               Recent Outpatient Visits              2 months ago Medicare annual wellness visit, subsequent    [facility]  MD Du    Office Visit    1 year ago Medicare annual wellness visit, subsequent    [facility], Du Stephenson MD    Office Visit    1 year ago Controlled type 2 diabetes mellitus without complication, without long-term current use of insulin (HCC)    [facility] [address], Du Stephenson MD    Office Visit    2 years ago Medicare annual wellness visit, subsequent    [facility], Du Stephenson MD    Office Visit    2 years ago Positive FIT (fecal immunochemical test)    [facility], [facility], [place] Beverley Cavazos APRN    Office Visit

## 2024-04-16 NOTE — TELEPHONE ENCOUNTER
Please review. Protocol Failed; No Protocol  Future Appointments   Date Time Provider Department Center   5/3/2024 11:30 AM Du Gardner MD [facility]   5/17/2024  9:00 AM ADO US RM1 ADO US EM [place]    Message sent to patient to complete labs     Requested Prescriptions   Pending Prescriptions Disp Refills    METFORMIN HCL 1000 MG Oral Tab [Pharmacy Med Name: METFORMIN 1000MG TABLETS] 90 tablet 1     Sig: TAKE 1 TABLET(1000 MG) BY MOUTH EVERY NIGHT       Diabetes Medication Protocol Failed - 4/15/2024  6:05 AM        Failed - Last A1C < 7.5 and within past 6 months     Lab Results   Component Value Date    A1C 8.6 (A) 11/02/2023             Failed - EGFRCR or GFRNAA > 50     GFR Evaluation            Failed - GFR in the past 12 months        Passed - In person appointment or virtual visit in the past 6 mos or appointment in next 3 mos     Recent Outpatient Visits              5 months ago Medicare annual wellness visit, subsequent    [facility] Du Gardner MD    Office Visit    1 year ago Medicare annual wellness visit, subsequent    [facility] Du Stephenson MD    Office Visit    1 year ago Controlled type 2 diabetes mellitus without complication, without long-term current use of insulin (HCC)    [facility] Du Stephenson MD    Office Visit    2 years ago Medicare annual wellness visit, subsequent    [facility] Du Gardner MD    Office Visit    2 years ago Positive FIT (fecal immunochemical test)    [facility], [facility] Beverley Cavazos APRN    Office Visit          Future Appointments         Provider Department Appt Notes    In 2 weeks Du Gardner MD [facility] 3 month check diabetic, pain in heel    In 1 month ADO  US [address] Ultrasound - [place] Diabetic                    Passed - Microalbumin procedure in past 12 months or taking ACE/ARB               Future Appointments         Provider Department Appt Notes    In 2 weeks Du Gardner MD [facility] 3 month check diabetic, pain in heel    In 1 month ADO US [address] Ultrasound - [place] Diabetic          Recent Outpatient Visits              5 months ago Medicare annual wellness visit, subsequent    [facility] Du Gardner MD    Office Visit    1 year ago Medicare annual wellness visit, subsequent    [facility] Du Gardner MD    Office Visit    1 year ago Controlled type 2 diabetes mellitus without complication, without long-term current use of insulin (HCC)    [facility], Du Stephenson MD    Office Visit    2 years ago Medicare annual wellness visit, subsequent    [facility] Du Gardner MD    Office Visit    2 years ago Positive FIT (fecal immunochemical test)    [facility], [facility] Beverley Cavazos APRN    Office Visit

## 2024-05-03 ENCOUNTER — OFFICE VISIT (OUTPATIENT)
Dept: INTERNAL MEDICINE CLINIC | Facility: CLINIC | Age: 74
End: 2024-05-03

## 2024-05-03 VITALS
HEART RATE: 60 BPM | TEMPERATURE: 98 F | BODY MASS INDEX: 30.18 KG/M2 | DIASTOLIC BLOOD PRESSURE: 66 MMHG | OXYGEN SATURATION: 97 % | SYSTOLIC BLOOD PRESSURE: 120 MMHG | WEIGHT: 181.13 LBS | HEIGHT: 65 IN

## 2024-05-03 DIAGNOSIS — E78.5 HYPERLIPIDEMIA ASSOCIATED WITH TYPE 2 DIABETES MELLITUS (HCC): ICD-10-CM

## 2024-05-03 DIAGNOSIS — E11.59 HYPERTENSION ASSOCIATED WITH TYPE 2 DIABETES MELLITUS (HCC): ICD-10-CM

## 2024-05-03 DIAGNOSIS — I15.2 HYPERTENSION ASSOCIATED WITH TYPE 2 DIABETES MELLITUS (HCC): ICD-10-CM

## 2024-05-03 DIAGNOSIS — E11.69 HYPERLIPIDEMIA ASSOCIATED WITH TYPE 2 DIABETES MELLITUS (HCC): ICD-10-CM

## 2024-05-03 DIAGNOSIS — M25.572 LEFT ANKLE PAIN, UNSPECIFIED CHRONICITY: ICD-10-CM

## 2024-05-03 DIAGNOSIS — Z13.6 SCREENING FOR HEART DISEASE: ICD-10-CM

## 2024-05-03 DIAGNOSIS — E11.65 TYPE 2 DIABETES MELLITUS WITH HYPERGLYCEMIA, WITHOUT LONG-TERM CURRENT USE OF INSULIN (HCC): Primary | ICD-10-CM

## 2024-05-03 LAB — HEMOGLOBIN A1C: 9.7 % (ref 4.3–5.6)

## 2024-05-03 PROCEDURE — 83036 HEMOGLOBIN GLYCOSYLATED A1C: CPT | Performed by: INTERNAL MEDICINE

## 2024-05-03 PROCEDURE — 99214 OFFICE O/P EST MOD 30 MIN: CPT | Performed by: INTERNAL MEDICINE

## 2024-05-03 NOTE — PROGRESS NOTES
Subjective:     Patient ID: Ray Phillips is a 73 year old male.    Diabetes  He presents for his follow-up diabetic visit. He has type 2 diabetes mellitus. There are no hypoglycemic associated symptoms. There are no diabetic associated symptoms. Pertinent negatives for diabetes include no chest pain. There are no hypoglycemic complications. Pertinent negatives for diabetic complications include no CVA, PVD or retinopathy. Risk factors for coronary artery disease include diabetes mellitus, dyslipidemia, hypertension, male sex and obesity. Current diabetic treatment includes diet and oral agent (monotherapy). He is compliant with treatment most of the time. He is following a generally healthy diet. Meal planning includes avoidance of concentrated sweets. He has not had a previous visit with a dietitian. He participates in exercise intermittently. An ACE inhibitor/angiotensin II receptor blocker is being taken. He does not see a podiatrist.Eye exam is current.   Hyperlipidemia  This is a chronic problem. The current episode started more than 1 year ago. The problem is controlled. Recent lipid tests were reviewed and are normal. Exacerbating diseases include diabetes and obesity. He has no history of chronic renal disease, hypothyroidism, liver disease or nephrotic syndrome. There are no known factors aggravating his hyperlipidemia. Pertinent negatives include no chest pain or shortness of breath. Current antihyperlipidemic treatment includes statins. The current treatment provides significant improvement of lipids. There are no compliance problems.  Risk factors for coronary artery disease include diabetes mellitus, dyslipidemia, hypertension, male sex and obesity.   Hypertension  This is a chronic problem. The current episode started more than 1 year ago. The problem has been gradually improving since onset. The problem is controlled. Pertinent negatives include no chest pain, palpitations, peripheral edema or  shortness of breath. There are no associated agents to hypertension. Risk factors for coronary artery disease include diabetes mellitus, dyslipidemia, obesity and male gender. Past treatments include ACE inhibitors and lifestyle changes. The current treatment provides significant improvement. There are no compliance problems.  There is no history of angina, kidney disease, CAD/MI, CVA, heart failure, PVD or retinopathy. There is no history of chronic renal disease or a hypertension causing med.       History/Other:   Review of Systems   Constitutional: Negative.    Respiratory: Negative.  Negative for shortness of breath.    Cardiovascular: Negative.  Negative for chest pain, palpitations and leg swelling.   Gastrointestinal: Negative.    Genitourinary: Negative.    Neurological:  Negative for syncope.     Current Outpatient Medications   Medication Sig Dispense Refill    metFORMIN HCl 1000 MG Oral Tab Take 1 tablet (1,000 mg total) by mouth nightly. 90 tablet 1    ramipril 5 MG Oral Cap Take 1 capsule (5 mg total) by mouth daily. 90 capsule 1    rosuvastatin 10 MG Oral Tab Take 1 tablet (10 mg total) by mouth nightly. 90 tablet 3    Omega-3 Fatty Acids (FISH OIL) 1200 MG Oral Cap Take 1 capsule by mouth daily.      Misc Natural Products (GLUCOSAMINE CHOND MSM FORMULA OR) Take 1 tablet by mouth daily.      Multiple Vitamin (MULTI-VITAMIN DAILY) Oral Tab Take 1 tablet by mouth daily.      Ginger, Zingiber officinalis, (GINGER ROOT) 550 MG Oral Cap Take 1 capsule by mouth daily.      Garlic 2000 MG Oral Cap Take 1 tablet by mouth daily.      Glucose Blood (ACCU-CHEK GUIDE) In Vitro Strip TEST ONCE DAILY 100 strip 3    Emollient (AMLACTIN ULTRA SMOOTHING) 15 % External Cream Apply 1 Application topically daily as needed. (Patient not taking: Reported on 5/3/2024) 140 g 1    glimepiride 2 MG Oral Tab Take 1 tablet (2 mg total) by mouth daily with breakfast. (Patient not taking: Reported on 5/3/2024) 90 tablet 0    Blood  Glucose Monitoring Suppl (ACCU-CHEK GUIDE) w/Device Does not apply Kit TEST DAILY BEFORE BREAKFAST AS DIRECTED 1 kit 0    Accu-Chek Softclix Lancets Does not apply Misc Use once daily       Allergies:No Known Allergies    Past Medical History:    Calculus of kidney    2003    Diabetes (HCC)    2019    Essential hypertension    Hyperlipidemia      Past Surgical History:   Procedure Laterality Date    Colonoscopy N/A 10/14/2021    Procedure: COLONOSCOPY;  Surgeon: Ray Bahena MD;  Location: [facility]      Family History   Problem Relation Age of Onset    Stroke Mother     Diabetes Sister     Cancer Brother         prostate cancer  61 y/o    Diabetes Brother       Social History:   Social History     Socioeconomic History    Marital status:    Tobacco Use    Smoking status: Former     Current packs/day: 0.00     Types: Cigarettes     Quit date: 1970     Years since quittin.2     Passive exposure: Past    Smokeless tobacco: Never   Vaping Use    Vaping status: Never Used   Substance and Sexual Activity    Alcohol use: Not Currently    Drug use: Never        Objective:   Physical Exam  Constitutional:       General: He is not in acute distress.     Appearance: He is well-developed. He is not ill-appearing, toxic-appearing or diaphoretic.   HENT:      Head: Normocephalic and atraumatic.      Right Ear: Tympanic membrane, ear canal and external ear normal.      Left Ear: Tympanic membrane, ear canal and external ear normal.      Nose: Nose normal.      Mouth/Throat:      Pharynx: No oropharyngeal exudate.   Eyes:      General: No scleral icterus.        Right eye: No discharge.         Left eye: No discharge.      Conjunctiva/sclera: Conjunctivae normal.      Pupils: Pupils are equal, round, and reactive to light.   Neck:      Thyroid: No thyromegaly.      Vascular: No JVD.   Cardiovascular:      Rate and Rhythm: Normal rate and regular rhythm.      Pulses:           Dorsalis pedis pulses are 3+ on the  right side and 3+ on the left side.        Posterior tibial pulses are 3+ on the right side and 3+ on the left side.      Heart sounds: Normal heart sounds. No murmur heard.     No friction rub. No gallop.   Pulmonary:      Effort: Pulmonary effort is normal. No respiratory distress.      Breath sounds: Normal breath sounds. No wheezing or rales.   Abdominal:      General: Abdomen is flat. Bowel sounds are normal. There is no distension.      Palpations: Abdomen is soft. There is no mass.      Tenderness: There is no abdominal tenderness. There is no guarding or rebound.   Musculoskeletal:         General: Normal range of motion.      Cervical back: Normal range of motion and neck supple. No rigidity.      Right lower leg: No edema.      Left lower leg: No edema.      Right foot: No deformity.      Left foot: No deformity.   Feet:      Right foot:      Protective Sensation: 10 sites tested.  10 sites sensed.      Skin integrity: Skin integrity normal.      Toenail Condition: Right toenails are normal.      Left foot:      Protective Sensation: 10 sites tested.  10 sites sensed.      Skin integrity: Skin integrity normal.      Toenail Condition: Left toenails are normal.   Lymphadenopathy:      Cervical: No cervical adenopathy.   Skin:     General: Skin is warm and dry.      Coloration: Skin is not jaundiced or pale.      Findings: No rash.   Neurological:      Mental Status: He is alert and oriented to person, place, and time.   Psychiatric:         Mood and Affect: Mood normal.         Assessment & Plan:   (E11.65) Type 2 diabetes mellitus with hyperglycemia, without long-term current use of insulin ([facility])  (primary encounter diagnosis)  Plan: POC Glycohemoglobin [78936], Diabetic Education        - [place] [facility]        We did his aa1c and was 9.7 so dm not controlled. He is only taking metfomrin and didn't take his glimperide; will send him to dm education for his diet; I also d/w pt and  daughter regarding using SGLT2 or GLP 1 agonist instead of glimperide due to their CVD benefits. After discussing potential side effects and contraindications, they agreed on jardiance; pt to do his labs first to make sure GFR is acceptable range before starting.    Rtc in 3mos    (E11.69,  E78.5) Hyperlipidemia associated with type 2 diabetes mellitus (HCC)  Plan: check lipid panel; continue with low chol diet and chol med.     (E11.59,  I15.2) Hypertension associated with type 2 diabetes mellitus (HCC)  Plan: bp controlled. Cpm.     (Z13.6) Screening for heart disease  Plan: pt advissed to do heart screening with heart scan cac test.  Info given .    (M25.572) Left ankle pain, unspecified chronicity  Plan: complained of pain on left ankle/leg back in Jan 2024 but already has resolved. I told hm if recurs to call back.       Orders Placed This Encounter   Procedures    POC Glycohemoglobin [50319]       Meds This Visit:  Requested Prescriptions      No prescriptions requested or ordered in this encounter       Imaging & Referrals:  None

## 2024-05-04 ENCOUNTER — LAB ENCOUNTER (OUTPATIENT)
Dept: LAB | Age: 74
End: 2024-05-04
Attending: INTERNAL MEDICINE
Payer: MEDICARE

## 2024-05-04 DIAGNOSIS — Z00.00 MEDICARE ANNUAL WELLNESS VISIT, SUBSEQUENT: ICD-10-CM

## 2024-05-04 DIAGNOSIS — E78.5 HYPERLIPIDEMIA ASSOCIATED WITH TYPE 2 DIABETES MELLITUS (HCC): ICD-10-CM

## 2024-05-04 DIAGNOSIS — E11.69 HYPERLIPIDEMIA ASSOCIATED WITH TYPE 2 DIABETES MELLITUS (HCC): ICD-10-CM

## 2024-05-04 DIAGNOSIS — E11.65 TYPE 2 DIABETES MELLITUS WITH HYPERGLYCEMIA, WITHOUT LONG-TERM CURRENT USE OF INSULIN (HCC): ICD-10-CM

## 2024-05-04 DIAGNOSIS — Z12.5 PROSTATE CANCER SCREENING: ICD-10-CM

## 2024-05-04 LAB
ALBUMIN SERPL-MCNC: 4.5 G/DL (ref 3.2–4.8)
ALBUMIN/GLOB SERPL: 1.8 {RATIO} (ref 1–2)
ALP LIVER SERPL-CCNC: 64 U/L
ALT SERPL-CCNC: 18 U/L
ANION GAP SERPL CALC-SCNC: 6 MMOL/L (ref 0–18)
AST SERPL-CCNC: 17 U/L (ref ?–34)
BASOPHILS # BLD AUTO: 0.05 X10(3) UL (ref 0–0.2)
BASOPHILS NFR BLD AUTO: 0.8 %
BILIRUB SERPL-MCNC: 0.6 MG/DL (ref 0.2–1.1)
BUN BLD-MCNC: 16 MG/DL (ref 9–23)
BUN/CREAT SERPL: 15.8 (ref 10–20)
CALCIUM BLD-MCNC: 9.5 MG/DL (ref 8.7–10.4)
CHLORIDE SERPL-SCNC: 101 MMOL/L (ref 98–112)
CHOLEST SERPL-MCNC: 126 MG/DL (ref ?–200)
CO2 SERPL-SCNC: 28 MMOL/L (ref 21–32)
COMPLEXED PSA SERPL-MCNC: 0.79 NG/ML (ref ?–4)
CREAT BLD-MCNC: 1.01 MG/DL
CREAT UR-SCNC: 70.1 MG/DL
DEPRECATED RDW RBC AUTO: 42 FL (ref 35.1–46.3)
EGFRCR SERPLBLD CKD-EPI 2021: 78 ML/MIN/1.73M2 (ref 60–?)
EOSINOPHIL # BLD AUTO: 0.08 X10(3) UL (ref 0–0.7)
EOSINOPHIL NFR BLD AUTO: 1.2 %
ERYTHROCYTE [DISTWIDTH] IN BLOOD BY AUTOMATED COUNT: 12.2 % (ref 11–15)
FASTING PATIENT LIPID ANSWER: YES
FASTING STATUS PATIENT QL REPORTED: YES
GLOBULIN PLAS-MCNC: 2.5 G/DL (ref 2–3.5)
GLUCOSE BLD-MCNC: 212 MG/DL (ref 70–99)
HCT VFR BLD AUTO: 43.7 %
HDLC SERPL-MCNC: 45 MG/DL (ref 40–59)
HGB BLD-MCNC: 15.2 G/DL
IMM GRANULOCYTES # BLD AUTO: 0.01 X10(3) UL (ref 0–1)
IMM GRANULOCYTES NFR BLD: 0.2 %
LDLC SERPL CALC-MCNC: 52 MG/DL (ref ?–100)
LYMPHOCYTES # BLD AUTO: 3.3 X10(3) UL (ref 1–4)
LYMPHOCYTES NFR BLD AUTO: 49.7 %
MCH RBC QN AUTO: 32.2 PG (ref 26–34)
MCHC RBC AUTO-ENTMCNC: 34.8 G/DL (ref 31–37)
MCV RBC AUTO: 92.6 FL
MICROALBUMIN UR-MCNC: 0.7 MG/DL
MICROALBUMIN/CREAT 24H UR-RTO: 10 UG/MG (ref ?–30)
MONOCYTES # BLD AUTO: 0.49 X10(3) UL (ref 0.1–1)
MONOCYTES NFR BLD AUTO: 7.4 %
NEUTROPHILS # BLD AUTO: 2.71 X10 (3) UL (ref 1.5–7.7)
NEUTROPHILS # BLD AUTO: 2.71 X10(3) UL (ref 1.5–7.7)
NEUTROPHILS NFR BLD AUTO: 40.7 %
NONHDLC SERPL-MCNC: 81 MG/DL (ref ?–130)
OSMOLALITY SERPL CALC.SUM OF ELEC: 287 MOSM/KG (ref 275–295)
PLATELET # BLD AUTO: 183 10(3)UL (ref 150–450)
POTASSIUM SERPL-SCNC: 4.4 MMOL/L (ref 3.5–5.1)
PROT SERPL-MCNC: 7 G/DL (ref 5.7–8.2)
RBC # BLD AUTO: 4.72 X10(6)UL
SODIUM SERPL-SCNC: 135 MMOL/L (ref 136–145)
TRIGL SERPL-MCNC: 175 MG/DL (ref 30–149)
VLDLC SERPL CALC-MCNC: 25 MG/DL (ref 0–30)
WBC # BLD AUTO: 6.6 X10(3) UL (ref 4–11)

## 2024-05-04 PROCEDURE — 80061 LIPID PANEL: CPT

## 2024-05-04 PROCEDURE — 36415 COLL VENOUS BLD VENIPUNCTURE: CPT

## 2024-05-04 PROCEDURE — 82043 UR ALBUMIN QUANTITATIVE: CPT

## 2024-05-04 PROCEDURE — 85025 COMPLETE CBC W/AUTO DIFF WBC: CPT

## 2024-05-04 PROCEDURE — 80053 COMPREHEN METABOLIC PANEL: CPT

## 2024-05-04 PROCEDURE — 82570 ASSAY OF URINE CREATININE: CPT

## 2024-05-07 ENCOUNTER — PATIENT MESSAGE (OUTPATIENT)
Dept: INTERNAL MEDICINE CLINIC | Facility: CLINIC | Age: 74
End: 2024-05-07

## 2024-05-08 NOTE — TELEPHONE ENCOUNTER
Dr. Gardner, please see [facility] message and advise. Thanks.    Informed patient metformin was lready sent.     Disp Refills Start End    metFORMIN HCl 1000 MG Oral Tab 90 tablet 1 4/16/2024 --    Sig - Route: Take 1 tablet (1,000 mg total) by mouth nightly. - Oral    Sent to pharmacy as: metFORMIN HCl 1000 MG Oral Tablet (GLUCOPHAGE)    E-Prescribing Status: Receipt confirmed by pharmacy (4/16/2024  1:40 PM CDT)      Pharmacy    [id number] [address] AT [address], [phone], [phone]     Future Appointments   Date Time Provider Department Center   5/9/2024  1:00 PM Mary West, RN [facility] DIABETES EM [facility]   5/17/2024  9:00 AM ADO US RM1 ADO US EM [place]   8/12/2024  9:30 AM [facility] CT RM1 [facility] CT SCAN EM [facility]

## 2024-05-08 NOTE — TELEPHONE ENCOUNTER
From: Ray Phillips  To: Du Gardner  Sent: 5/7/2024 8:32 PM CDT  Subject: A1C test results/medication     Hello Dr. Dos Santos,   How did you want to proceed with lowering the A1C levels? We ate scheduling an appointment with the diabetic learning center, as you had suggested. Can you please refill prescription other than the metformin?  Thanks in advance

## 2024-05-09 ENCOUNTER — HOSPITAL ENCOUNTER (OUTPATIENT)
Dept: ENDOCRINOLOGY | Facility: HOSPITAL | Age: 74
Discharge: HOME | End: 2024-05-09
Attending: INTERNAL MEDICINE
Payer: MEDICARE

## 2024-05-09 VITALS — BODY MASS INDEX: 30 KG/M2 | WEIGHT: 179.13 LBS

## 2024-05-09 DIAGNOSIS — E11.65 TYPE 2 DIABETES MELLITUS WITH HYPERGLYCEMIA, WITHOUT LONG-TERM CURRENT USE OF INSULIN (HCC): Primary | ICD-10-CM

## 2024-05-09 NOTE — TELEPHONE ENCOUNTER
His labs showed his psa, chem panel, chol panel and urine test were good.  His A1c on last visit was 9.6  Start jardiance 10mg po daily #90 and ocontinue his metformin; watch diet, see dm educator as planned.   Ffup ihn 3mos. Moitor glucose daily also.

## 2024-05-09 NOTE — PROGRESS NOTES
Ray Phillips : 1950  attended individual initial assessment for Diabetes Education:    Date: 2024         Start time: 1:00 pm  End time: 2:15 pm     HEMOGLOBIN A1C (%)   Date Value   2024 9.7 (A)       Wt Readings from Last 1 Encounters:   24 179 lb 1.6 oz       Assessment: Mr Edwin Phillips  is a 73 year old male diagnoses with diabetes mellitus approximately 4 years ago. He has been managing his diabetes with healthy eating and metformin since diagnosis. He reports that in the last year his diabetes has gotten out of control and his plan of treatment is not working as well as it uses to. He was recently prescribed Jardiance 10 mg daily which he has not started yet as of today. Discussed the action and considerations of Jardiance. Patient voiced understanding.Patient expressed interest in learning about continuous monitoring systems. He currently has a accu-check meter at home but does not use. Patient is willing to pay out of pocket cost for a CGM. Will discuss further on next follow up. Patient may need a separate referral for CGM education and placement.    Diet Hx: Eats 3 meals a day and 2-3 snacks  Meal 1 : eggs with 2-3 tortillas fruit and coffee  Snack  Meal 2: Tostadas with potatoes, lettuce/tomatoes, rice and cactus  Snack: fruit and or a protein shake  Meal 3: cereal     Education provided on the below topics:     Diabetes Overview:  Pathophysiology, A1C results and treatment options for diabetes self-management reviewed.   Described basic process and treatment options for diabetes self-management.  Introduced long term impact of uncontrolled blood glucose on health.    Healthy Eating:  Obtained usual diet history.  Instructed on Basic Diet Guidelines which includes eating 3 meals/day. Eat moderate amounts at consistent times from day to day. Limit/avoid sweets. Instructed on Balanced Plate Method of meal planning. Instructed to limit grains or starchy vegetables to ¼ of plate,  protein to ¼ of plate, non-starchy vegetables to ½ plate and modest portions of fruit, yogurt, milk as desired.    Being Active:   Encouraged Physical Activity and briefly reviewed ADA recommended guidelines for activity with type 2 diabetes.    Taking Medications:   Reviewed current diabetes medications (if applicable).    Monitoring:  Instructed/reinforced blood glucose monitoring, testing schedules and target goals:   Fasting / Pre-meal  mg/dL 2 Hour Post-prandial <180 mg/dL  Demonstrated ability to perform blood glucose testing.     Problem Solving:   Prevention, detection and treatment of acute complications: taught symptoms of hypoglycemia, hyperglycemia, how to treat low blood sugar (Rule of 15) and actions for lowering high blood glucose levels.     Behavior Change:  Initiated individualized goal setting process and will continue to refine throughout class series.    Recommendations:      Monitor blood glucose as directed.  Follow Balance Plate method of meal planning.   Attend  all  Classes in series       Prescriptions sent to preferred pharmacy for blood glucose meter, test strips and lancets per protocol.  Written materials provided for all areas covered.  Patient verbalized understanding and all questions answered.    Mary West RN

## 2024-05-17 ENCOUNTER — HOSPITAL ENCOUNTER (OUTPATIENT)
Dept: ULTRASOUND IMAGING | Age: 74
Discharge: HOME | End: 2024-05-17
Attending: INTERNAL MEDICINE

## 2024-05-17 DIAGNOSIS — Z13.6 ENCOUNTER FOR ABDOMINAL AORTIC ANEURYSM (AAA) SCREENING: ICD-10-CM

## 2024-05-17 PROCEDURE — 76706 US ABDL AORTA SCREEN AAA: CPT | Performed by: INTERNAL MEDICINE

## 2024-05-22 ENCOUNTER — HOSPITAL ENCOUNTER (OUTPATIENT)
Dept: ENDOCRINOLOGY | Facility: HOSPITAL | Age: 74
Discharge: HOME | End: 2024-05-22
Attending: INTERNAL MEDICINE

## 2024-05-22 VITALS — WEIGHT: 176 LBS | BODY MASS INDEX: 29 KG/M2

## 2024-05-22 DIAGNOSIS — E11.65 TYPE 2 DIABETES MELLITUS WITH HYPERGLYCEMIA, WITHOUT LONG-TERM CURRENT USE OF INSULIN (HCC): Primary | ICD-10-CM

## 2024-05-22 NOTE — PROGRESS NOTES
Ray Phillips  DOB9/14/1950 attended Step 3 Group Diabetes Education Class:    Date: 5/22/2024  Start time: 10:30 am End time: 11:: 30 am    Wt:   Wt Readings from Last 1 Encounters:   05/22/24 176 lb     Weight change since start of program: -3.1 lbs    Blood Glucose: Not controlled: Progressing toward goal   Monitoring twice a day -174 (10 readings) 2 hrs after a meal 131-176  (10 readings)  Patient was able to identify that the amount of carbohydrate at bedtime snack was often high.   Healthy Eating:  Reviewed Basic Diet Guidelines as foundation of diabetes meal planning. Reinforced the balanced plate method. Taught nutrition basics defining carbohydrate, protein, and fat. Taught label reading, including an option to count carbohydrate grams or servings. Provided patient with goals for carbohydrate grams/choices for meals and snacks. Discussed sugar substitutes,  Tracy's helpful for smart phones, as well as websites for examining carbohydrate levels provided. Reviewed and reinforced macronutrient's, carbohydrate counting and meal planning. Practiced carbohydrate counting using patient's own meal samples.    Problem Solving:  Reinforced signs, symptoms, and treatment of hypoglycemia using the Rule of 15.  Discussed impact of different food items and portion sizes on blood glucose levels.  Discussed blood glucose target of 180 mg/dL, if testing 2 hours post meal.    Monitoring:  Reviewed blood glucose records and importance of tracking foods/blood glucose levels.  Reinforced the importance of taking medications as prescribed.     Behavior Change:  Reviewed and updated individual goals and action plan set by patient.   Addressed barriers to tracking foods/blood glucose levels and following guidelines presented/achieving goals, as a group discussion.    Recommendations:  Follow individual meal plan recommended by Educator (JOSEFINA).  Continue implementing individual goals.   Attend remaining sessions.  Begin  implementing carbohydrate counting and continue dietary and blood glucose tracking.    Written materials provided for all areas covered.    Patient verbalized understanding and has no further questions currently     Mary West RN

## 2024-06-03 ENCOUNTER — TELEPHONE (OUTPATIENT)
Dept: INTERNAL MEDICINE CLINIC | Facility: CLINIC | Age: 74
End: 2024-06-03

## 2024-06-03 DIAGNOSIS — E11.65 TYPE 2 DIABETES MELLITUS WITH HYPERGLYCEMIA, WITHOUT LONG-TERM CURRENT USE OF INSULIN (HCC): Primary | ICD-10-CM

## 2024-06-04 ENCOUNTER — PATIENT MESSAGE (OUTPATIENT)
Dept: INTERNAL MEDICINE CLINIC | Facility: CLINIC | Age: 74
End: 2024-06-04

## 2024-06-05 RX ORDER — LANCETS
EACH MISCELLANEOUS
Qty: 100 EACH | Refills: 3 | Status: SHIPPED | OUTPATIENT
Start: 2024-06-05

## 2024-06-05 RX ORDER — BLOOD SUGAR DIAGNOSTIC
STRIP MISCELLANEOUS
Qty: 100 STRIP | Refills: 3 | Status: SHIPPED | OUTPATIENT
Start: 2024-06-05

## 2024-06-05 NOTE — TELEPHONE ENCOUNTER
From: Ray Phillips  To: Du Gardner  Sent: 6/4/2024 12:11 PM CDT  Subject: Refill for test strips    Jason DosS antos   I requested refills for my test strips and lancets( needles) .  Would it please be possible to get them soon? Leaving on a trip and need them.  Thank you in advance,   Ray

## 2024-06-05 NOTE — TELEPHONE ENCOUNTER
From: Ray Phillips  To: Du Gardner  Sent: 6/4/2024 12:11 PM CDT  Subject: Refill for test strips    Jason Dos Santos  I requested refills for my test strips and lancets( needles) .  Would it please be possible to get them soon? Leaving on a trip and need them.  Thank you in advance,  Ray

## 2024-07-17 RX ORDER — ROSUVASTATIN CALCIUM 10 MG/1
10 TABLET, COATED ORAL NIGHTLY
Qty: 90 TABLET | Refills: 3 | Status: SHIPPED | OUTPATIENT
Start: 2024-07-17

## 2024-07-17 RX ORDER — RAMIPRIL 5 MG/1
5 CAPSULE ORAL DAILY
Qty: 90 CAPSULE | Refills: 3 | Status: SHIPPED | OUTPATIENT
Start: 2024-07-17

## 2024-07-17 NOTE — TELEPHONE ENCOUNTER
Refill Passed Per Protocol    Requested Prescriptions   Pending Prescriptions Disp Refills    ROSUVASTATIN 10 MG Oral Tab [Pharmacy Med Name: ROSUVASTATIN 10MG TABLETS] 90 tablet 3     Sig: TAKE 1 TABLET(10 MG) BY MOUTH EVERY NIGHT       Cholesterol Medication Protocol Passed - 7/14/2024  6:16 AM        Passed - ALT < 80     Lab Results   Component Value Date    ALT 18 05/04/2024             Passed - ALT resulted within past year        Passed - Lipid panel within past 12 months     Lab Results   Component Value Date    CHOLEST 126 05/04/2024    TRIG 175 (H) 05/04/2024    HDL 45 05/04/2024    LDL 52 05/04/2024    VLDL 25 05/04/2024    NONHDLC 81 05/04/2024             Passed - In person appointment or virtual visit in the past 12 mos or appointment in next 3 mos     Recent Outpatient Visits              2 months ago Type 2 diabetes mellitus with hyperglycemia, without long-term current use of insulin ([facility])    [facility]    Office Visit    8 months ago Medicare annual wellness visit, subsequent    [facility]    Office Visit    1 year ago Medicare annual wellness visit, subsequent    [facility]    Office Visit    1 year ago Controlled type 2 diabetes mellitus without complication, without long-term current use of insulin ([facility])    [facility]    Office Visit    2 years ago Medicare annual wellness visit, subsequent    [facility]    [facility]          Future Appointments         Provider Department Appt Notes    In 3 weeks [facility] RN RADIOLOGY 1 CALCIUM SCORE; [facility] CT RM[facility] Explain how glucose readings, cholesterol etc are related to heart problems please                       RAMIPRIL 5 MG Oral Cap [Pharmacy Med Name: RAMIPRIL 5MG CAPSULES] 90 capsule 1     Sig: TAKE 1 CAPSULE(5 MG) BY MOUTH DAILY       Hypertension Medications Protocol Passed - 7/14/2024  6:16 AM        Passed - CMP or BMP in past 12 months        Passed - Last BP reading less than 140/90     BP Readings from Last 1 Encounters:   05/03/24 120/66               Passed - In person appointment or virtual visit in the past 12 mos or appointment in next 3 mos     Recent Outpatient Visits              2 months ago Type 2 diabetes mellitus with hyperglycemia, without long-term current use of insulin ([facility])    [facility]    Office Visit    8 months ago Medicare annual wellness visit, subsequent    [facility]    Office Visit    1 year ago Medicare annual wellness visit, subsequent    [facility]    Office Visit    1 year ago Controlled type 2 diabetes mellitus without complication, without long-term current use of insulin ([facility])    [facility]    Office Visit    2 years ago Medicare annual wellness visit, subsequent    [facility]    Office Visit          Future Appointments         Provider Department Appt Notes    In 3 weeks [facility] RN RADIOLOGY 1 CALCIUM SCORE; [address] Explain how glucose readings, cholesterol etc are related to heart problems please                    Passed - EGFRCR or GFRNAA > 50     GFR Evaluation  EGFRCR: 78 , resulted on 5/4/2024               Future Appointments         Provider Department Appt Notes    In 3 weeks [facility] RN RADIOLOGY 1 CALCIUM SCORE; [address] Explain how glucose readings,  cholesterol etc are related to heart problems please          Recent Outpatient Visits              2 months ago Type 2 diabetes mellitus with hyperglycemia, without long-term current use of insulin ([facility]    Office Visit    8 months ago Medicare annual wellness visit, subsequent    [facility]    [facility] Visit    1 year ago Medicare annual wellness visit, subsequent    [facility]    Office Visit    1 year ago Controlled type 2 diabetes mellitus without complication, without long-term current use of insulin ([facility])    [facility]    Office Visit    2 years ago Medicare annual wellness visit, subsequent    [facility]    [facility] Visit

## 2024-08-13 ENCOUNTER — OFFICE VISIT (OUTPATIENT)
Dept: INTERNAL MEDICINE CLINIC | Facility: CLINIC | Age: 74
End: 2024-08-13

## 2024-08-13 VITALS
RESPIRATION RATE: 17 BRPM | HEART RATE: 67 BPM | DIASTOLIC BLOOD PRESSURE: 70 MMHG | TEMPERATURE: 98 F | WEIGHT: 166 LBS | OXYGEN SATURATION: 98 % | HEIGHT: 65 IN | BODY MASS INDEX: 27.66 KG/M2 | SYSTOLIC BLOOD PRESSURE: 126 MMHG

## 2024-08-13 DIAGNOSIS — S91.002A WOUND OF LEFT ANKLE, INITIAL ENCOUNTER: Primary | ICD-10-CM

## 2024-08-13 PROCEDURE — 99214 OFFICE O/P EST MOD 30 MIN: CPT | Performed by: INTERNAL MEDICINE

## 2024-08-13 RX ORDER — MUPIROCIN CALCIUM 20 MG/G
1 CREAM TOPICAL 2 TIMES DAILY
Qty: 15 G | Refills: 1 | Status: SHIPPED | OUTPATIENT
Start: 2024-08-13 | End: 2024-08-20

## 2024-08-13 RX ORDER — CLINDAMYCIN HYDROCHLORIDE 300 MG/1
300 CAPSULE ORAL 3 TIMES DAILY
Qty: 30 CAPSULE | Refills: 0 | Status: SHIPPED | OUTPATIENT
Start: 2024-08-13 | End: 2024-08-23

## 2024-08-13 NOTE — PROGRESS NOTES
Subjective:   Patient ID: Ray Phillips is a 73 year old male.    HPI  Patient comes in today with complaint of having a wound on the left ankle ever since he thinks he got bit by something in [place] this was about 2 weeks ago when saw Dr. Armijo was given antibiotic treatment he has been taking it looks better but still not healed some pain around it patient is diabetic  History/Other:   Review of Systems   Constitutional: Negative.    HENT: Negative.     Eyes: Negative.    Respiratory: Negative.     Cardiovascular: Negative.    Gastrointestinal: Negative.    Genitourinary: Negative.    Musculoskeletal: Negative.    Skin:  Positive for wound.        Wound to left ankle   Neurological: Negative.    Psychiatric/Behavioral: Negative.       Current Outpatient Medications   Medication Sig Dispense Refill    clindamycin 300 MG Oral Cap Take 1 capsule (300 mg total) by mouth 3 (three) times daily for 10 days. 30 capsule 0    mupirocin 2 % External Cream Apply 1 Application topically 2 (two) times daily for 7 days. 15 g 1    rosuvastatin 10 MG Oral Tab Take 1 tablet (10 mg total) by mouth nightly. 90 tablet 3    ramipril 5 MG Oral Cap Take 1 capsule (5 mg total) by mouth daily. 90 capsule 3    Accu-Chek Softclix Lancets Does not apply Misc Use once daily 100 each 3    Glucose Blood (ACCU-CHEK GUIDE) In Vitro Strip TEST ONCE DAILY 100 strip 3    empagliflozin (JARDIANCE) 10 MG Oral Tab Take 1 tablet (10 mg total) by mouth daily. 90 tablet 1    metFORMIN HCl 1000 MG Oral Tab Take 1 tablet (1,000 mg total) by mouth nightly. 90 tablet 1    Blood Glucose Monitoring Suppl (ACCU-CHEK GUIDE) w/Device Does not apply Kit TEST DAILY BEFORE BREAKFAST AS DIRECTED 1 kit 0    Omega-3 Fatty Acids (FISH OIL) 1200 MG Oral Cap Take 1 capsule by mouth daily.      Misc Natural Products (GLUCOSAMINE CHOND MSM FORMULA OR) Take 1 tablet by mouth daily.      Multiple Vitamin (MULTI-VITAMIN DAILY) Oral Tab Take 1 tablet by mouth daily.       Ginger, Zingiber officinalis, (GINGER ROOT) 550 MG Oral Cap Take 1 capsule by mouth daily.      Garlic 2000 MG Oral Cap Take 1 tablet by mouth daily.      Emollient (AMLACTIN ULTRA SMOOTHING) 15 % External Cream Apply 1 Application topically daily as needed. (Patient not taking: Reported on 5/3/2024) 140 g 1    glimepiride 2 MG Oral Tab Take 1 tablet (2 mg total) by mouth daily with breakfast. (Patient not taking: Reported on 5/3/2024) 90 tablet 0     Allergies:No Known Allergies    Objective:   Physical Exam  Vitals and nursing note reviewed.   Constitutional:       Appearance: He is well-developed.   HENT:      Head: Normocephalic and atraumatic.      Right Ear: External ear normal.      Left Ear: External ear normal.      Nose: Nose normal.   Eyes:      Conjunctiva/sclera: Conjunctivae normal.      Pupils: Pupils are equal, round, and reactive to light.   Cardiovascular:      Rate and Rhythm: Normal rate and regular rhythm.      Heart sounds: Normal heart sounds.   Pulmonary:      Effort: Pulmonary effort is normal.      Breath sounds: Normal breath sounds.   Abdominal:      General: Bowel sounds are normal.      Palpations: Abdomen is soft.   Genitourinary:     Penis: Normal.       Prostate: Normal.      Rectum: Normal.   Musculoskeletal:         General: Normal range of motion.      Cervical back: Normal range of motion and neck supple.   Skin:     General: Skin is warm and dry.      Comments: Left ankle wound   Neurological:      Mental Status: He is alert and oriented to person, place, and time.      Deep Tendon Reflexes: Reflexes are normal and symmetric.         Assessment & Plan:   1. Wound of left ankle, initial encounter.  2 antibiotic given in [place] we will start him on clindamycin and Bactrim ointment take as prescribed if wound does not get better in the next 2 weeks followed see dermatology follow-up with PCP       No orders of the defined types were placed in this encounter.      Meds This  Visit:  Requested Prescriptions     Signed Prescriptions Disp Refills    clindamycin 300 MG Oral Cap 30 capsule 0     Sig: Take 1 capsule (300 mg total) by mouth 3 (three) times daily for 10 days.    mupirocin 2 % External Cream 15 g 1     Sig: Apply 1 Application topically 2 (two) times daily for 7 days.       Imaging & Referrals:  [facility] - INTERNAL

## 2024-08-29 ENCOUNTER — OFFICE VISIT (OUTPATIENT)
Dept: INTERNAL MEDICINE CLINIC | Facility: CLINIC | Age: 74
End: 2024-08-29

## 2024-08-29 VITALS
OXYGEN SATURATION: 97 % | TEMPERATURE: 98 F | WEIGHT: 165.88 LBS | HEART RATE: 62 BPM | DIASTOLIC BLOOD PRESSURE: 71 MMHG | BODY MASS INDEX: 27.64 KG/M2 | HEIGHT: 65 IN | SYSTOLIC BLOOD PRESSURE: 129 MMHG

## 2024-08-29 DIAGNOSIS — S00.86XS: Primary | ICD-10-CM

## 2024-08-29 DIAGNOSIS — E11.59 HYPERTENSION ASSOCIATED WITH TYPE 2 DIABETES MELLITUS (HCC): ICD-10-CM

## 2024-08-29 DIAGNOSIS — E11.69 HYPERLIPIDEMIA ASSOCIATED WITH TYPE 2 DIABETES MELLITUS (HCC): ICD-10-CM

## 2024-08-29 DIAGNOSIS — I15.2 HYPERTENSION ASSOCIATED WITH TYPE 2 DIABETES MELLITUS (HCC): ICD-10-CM

## 2024-08-29 DIAGNOSIS — E11.65 TYPE 2 DIABETES MELLITUS WITH HYPERGLYCEMIA, WITHOUT LONG-TERM CURRENT USE OF INSULIN (HCC): ICD-10-CM

## 2024-08-29 DIAGNOSIS — E78.5 HYPERLIPIDEMIA ASSOCIATED WITH TYPE 2 DIABETES MELLITUS (HCC): ICD-10-CM
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
## 2024-08-29 LAB — HEMOGLOBIN A1C: 7.4 % (ref 4.3–5.6)

## 2024-08-31 NOTE — PROGRESS NOTES
Patient aware of new prescription to pharmacy   Subjective:     Patient ID: Ray Phillips is a 73 year old male.    Pt presents today for ffup regarding insect bite on the left lower leg last month that got infected when he was still in [place]. He saw a local MD and was given oral abx, wound cleanser and wound healing powder.  He had ffup in the [facility] and saw Dr Ortega, was given another course of abx clindamycin. Pt states had good response and his left leg wound is now healing well.     Diabetes  He presents for his follow-up diabetic visit. He has type 2 diabetes mellitus. His disease course has been improving. There are no hypoglycemic associated symptoms. There are no diabetic associated symptoms. Pertinent negatives for diabetes include no chest pain. There are no hypoglycemic complications. Pertinent negatives for diabetic complications include no CVA, heart disease, peripheral neuropathy or PVD. Risk factors for coronary artery disease include diabetes mellitus, dyslipidemia, hypertension and male sex. Current diabetic treatment includes diet and oral agent (triple therapy). He is compliant with treatment most of the time. His weight is stable. He is following a generally healthy diet. Meal planning includes avoidance of concentrated sweets. He has had a previous visit with a dietitian. He participates in exercise intermittently. His breakfast blood glucose range is generally 130-140 mg/dl. An ACE inhibitor/angiotensin II receptor blocker is being taken. He does not see a podiatrist.Eye exam is not current.   Hypertension  This is a chronic problem. The current episode started more than 1 year ago. The problem has been gradually improving since onset. The problem is controlled. Pertinent negatives include no chest pain or shortness of breath. Risk factors for coronary artery disease include diabetes mellitus, dyslipidemia and male gender. Past treatments include ACE inhibitors and lifestyle changes. The current treatment provides significant  improvement. There are no compliance problems.  There is no history of angina, kidney disease, CAD/MI, CVA, heart failure or PVD. There is no history of chronic renal disease, a hypertension causing med or a thyroid problem.   Hyperlipidemia  This is a chronic problem. The current episode started more than 1 year ago. The problem is controlled. Recent lipid tests were reviewed and are normal. Exacerbating diseases include diabetes. He has no history of chronic renal disease, hypothyroidism, obesity or nephrotic syndrome. There are no known factors aggravating his hyperlipidemia. Pertinent negatives include no chest pain or shortness of breath. Current antihyperlipidemic treatment includes statins, diet change and exercise. The current treatment provides significant improvement of lipids. There are no compliance problems.  Risk factors for coronary artery disease include diabetes mellitus, dyslipidemia, hypertension and male sex.       History/Other:   Review of Systems   Respiratory: Negative.  Negative for shortness of breath.    Cardiovascular: Negative.  Negative for chest pain.   Gastrointestinal: Negative.    Genitourinary: Negative.      Current Outpatient Medications   Medication Sig Dispense Refill    rosuvastatin 10 MG Oral Tab Take 1 tablet (10 mg total) by mouth nightly. 90 tablet 3    ramipril 5 MG Oral Cap Take 1 capsule (5 mg total) by mouth daily. 90 capsule 3    Accu-Chek Softclix Lancets Does not apply Misc Use once daily 100 each 3    Glucose Blood (ACCU-CHEK GUIDE) In Vitro Strip TEST ONCE DAILY 100 strip 3    empagliflozin (JARDIANCE) 10 MG Oral Tab Take 1 tablet (10 mg total) by mouth daily. 90 tablet 1    metFORMIN HCl 1000 MG Oral Tab Take 1 tablet (1,000 mg total) by mouth nightly. 90 tablet 1    Emollient (AMLACTIN ULTRA SMOOTHING) 15 % External Cream Apply 1 Application topically daily as needed. 140 g 1    Blood Glucose Monitoring Suppl (ACCU-CHEK GUIDE) w/Device Does not apply Kit TEST  DAILY BEFORE BREAKFAST AS DIRECTED 1 kit 0    Omega-3 Fatty Acids (FISH OIL) 1200 MG Oral Cap Take 1 capsule by mouth daily.      Misc Natural Products (GLUCOSAMINE CHOND MSM FORMULA OR) Take 1 tablet by mouth daily.      Multiple Vitamin (MULTI-VITAMIN DAILY) Oral Tab Take 1 tablet by mouth daily.      Kyara, Zingiber officinalis, (KYARA ROOT) 550 MG Oral Cap Take 1 capsule by mouth daily.      Garlic 2000 MG Oral Cap Take 1 tablet by mouth daily.      glimepiride 2 MG Oral Tab Take 1 tablet (2 mg total) by mouth daily with breakfast. (Patient not taking: Reported on 5/3/2024) 90 tablet 0     Allergies:No Known Allergies    Past Medical History:    Calculus of kidney    2003    Diabetes (HCC)    2019    Essential hypertension    Hyperlipidemia      Past Surgical History:   Procedure Laterality Date    Colonoscopy N/A 10/14/2021    Procedure: COLONOSCOPY;  Surgeon: Ray Bahena MD;  Location: [facility]      Family History   Problem Relation Age of Onset    Stroke Mother     Diabetes Sister     Cancer Brother         prostate cancer  61 y/o    Diabetes Brother       Social History:   Social History     Socioeconomic History    Marital status:    Tobacco Use    Smoking status: Former     Current packs/day: 0.00     Types: Cigarettes     Quit date: 1970     Years since quittin.5     Passive exposure: Past    Smokeless tobacco: Never   Vaping Use    Vaping status: Never Used   Substance and Sexual Activity    Alcohol use: Not Currently    Drug use: Never        Objective:   Physical Exam  Constitutional:       General: He is not in acute distress.     Appearance: He is well-developed. He is not ill-appearing, toxic-appearing or diaphoretic.   HENT:      Head: Normocephalic and atraumatic.      Right Ear: External ear normal.      Left Ear: External ear normal.      Nose: Nose normal.      Mouth/Throat:      Pharynx: No oropharyngeal exudate.   Eyes:      General:         Right eye: No discharge.          Left eye: No discharge.      Conjunctiva/sclera: Conjunctivae normal.      Pupils: Pupils are equal, round, and reactive to light.   Neck:      Vascular: No carotid bruit or JVD.   Cardiovascular:      Rate and Rhythm: Normal rate and regular rhythm.      Heart sounds: Normal heart sounds. No murmur heard.  Pulmonary:      Effort: Pulmonary effort is normal. No respiratory distress.      Breath sounds: Normal breath sounds. No wheezing or rales.   Abdominal:      General: Bowel sounds are normal. There is no distension.      Palpations: Abdomen is soft. There is no mass.      Tenderness: There is no abdominal tenderness. There is no guarding or rebound.   Musculoskeletal:         General: No tenderness. Normal range of motion.      Cervical back: Normal range of motion and neck supple. No rigidity or tenderness.      Right lower leg: No edema.      Left lower leg: No edema.        Legs:       Comments: Previous site of insect bite on the left lower medial leg has now healed and scarred already.    Lymphadenopathy:      Cervical: No cervical adenopathy.   Skin:     General: Skin is warm and dry.      Coloration: Skin is not jaundiced or pale.      Findings: No rash.          Neurological:      Mental Status: He is alert and oriented to person, place, and time.         Assessment & Plan:   1. Insect bite of other part of head, sequela    2. Type 2 diabetes mellitus with hyperglycemia, without long-term current use of insulin (HCC)    3. Hypertension associated with type 2 diabetes mellitus (HCC)    4. Hyperlipidemia associated with type 2 diabetes mellitus (HCC)        Orders Placed This Encounter   Procedures    POC Glycohemoglobin [[id number]]       Meds This Visit:  Requested Prescriptions      No prescriptions requested or ordered in this encounter       Imaging & Referrals:  None

## 2024-10-30 NOTE — TELEPHONE ENCOUNTER
Patient daughter requesting refill     [id number] - [place], [place] - [address] [place] [address] AT [facility] OF [address],     edication Detail    Medication Quantity Refills Start End   empagliflozin (JARDIANCE) 10 MG Oral Tab 90 tablet 1 5/9/2024 --   Sig:   Take 1 tablet (10 mg total) by mouth daily.     Route:   Oral     Order #:   [id number]     Medication Detail    Medication Quantity Refills Start End   metFORMIN HCl 1000 MG Oral Tab 90 tablet 1 4/16/2024 --   Sig:   Take 1 tablet (1,000 mg total) by mouth nightly.     Route:   Oral     Order #:   [id number]

## 2024-10-30 NOTE — TELEPHONE ENCOUNTER
Refill passed per [facility] protocols.    Requested Prescriptions   Pending Prescriptions Disp Refills    empagliflozin (JARDIANCE) 10 MG Oral Tab 90 tablet 3     Sig: Take 1 tablet (10 mg total) by mouth daily.       Diabetes Medication Protocol Passed - 10/30/2024  1:55 PM        Passed - Last A1C < 7.5 and within past 6 months     Lab Results   Component Value Date    A1C 7.4 (A) 08/29/2024             Passed - In person appointment or virtual visit in the past 6 mos or appointment in next 3 mos     Recent Outpatient Visits              2 months ago Insect bite of other part of head, sequela    [facility], Du Stephenson MD    Office Visit    2 months ago Wound of left ankle, initial encounter    [facility], Matthias uW MD    Office Visit    6 months ago Type 2 diabetes mellitus with hyperglycemia, without long-term current use of insulin ([facility])    [facility], Du Stephenson MD    Office Visit    12 months ago Medicare annual wellness visit, subsequent    [facility], Du Stephenson MD    Office Visit    1 year ago Medicare annual wellness visit, subsequent    [facility], Du Stephenson MD    Office Visit                      Passed - Microalbumin procedure in past 12 months or taking ACE/ARB        Passed - EGFRCR or GFRNAA > 50     GFR Evaluation  EGFRCR: 78 , resulted on 5/4/2024          Passed - GFR in the past 12 months

## 2024-10-30 NOTE — TELEPHONE ENCOUNTER
Refill passed per [facility] protocols.    Requested Prescriptions   Pending Prescriptions Disp Refills    metFORMIN HCl 1000 MG Oral Tab 90 tablet 3     Sig: Take 1 tablet (1,000 mg total) by mouth nightly.       Diabetes Medication Protocol Passed - 10/30/2024  1:54 PM        Passed - Last A1C < 7.5 and within past 6 months     Lab Results   Component Value Date    A1C 7.4 (A) 08/29/2024             Passed - In person appointment or virtual visit in the past 6 mos or appointment in next 3 mos     Recent Outpatient Visits              2 months ago Insect bite of other part of head, sequela    [facility], Du Stephenson MD    Office Visit    2 months ago Wound of left ankle, initial encounter    [facility], Matthias Wu MD    Office Visit    6 months ago Type 2 diabetes mellitus with hyperglycemia, without long-term current use of insulin (HCC)    [facility], Du Stephenson MD    Office Visit    12 months ago Medicare annual wellness visit, subsequent    [facility], Du Stephenson MD    Office Visit    1 year ago Medicare annual wellness visit, [facility]                      Passed - Microalbumin procedure in past 12 months or taking ACE/ARB        Passed - EGFRCR or GFRNAA > 50     GFR Evaluation  EGFRCR: 78 , resulted on 5/4/2024          Passed - GFR in the past 12 months

## 2025-07-22 ENCOUNTER — OFFICE VISIT (OUTPATIENT)
Dept: INTERNAL MEDICINE CLINIC | Facility: CLINIC | Age: 75
End: 2025-07-22

## 2025-07-22 VITALS
OXYGEN SATURATION: 96 % | DIASTOLIC BLOOD PRESSURE: 70 MMHG | BODY MASS INDEX: 29.56 KG/M2 | SYSTOLIC BLOOD PRESSURE: 124 MMHG | HEART RATE: 57 BPM | HEIGHT: 65 IN | WEIGHT: 177.44 LBS

## 2025-07-22 DIAGNOSIS — Z86.0100 HISTORY OF COLON POLYPS: ICD-10-CM

## 2025-07-22 DIAGNOSIS — Z00.00 MEDICARE ANNUAL WELLNESS VISIT, SUBSEQUENT: Primary | ICD-10-CM

## 2025-07-22 DIAGNOSIS — Z13.6 SCREENING FOR HEART DISEASE: ICD-10-CM

## 2025-07-22 DIAGNOSIS — E78.2 MIXED HYPERLIPIDEMIA: ICD-10-CM

## 2025-07-22 DIAGNOSIS — E11.65 TYPE 2 DIABETES MELLITUS WITH HYPERGLYCEMIA, WITHOUT LONG-TERM CURRENT USE OF INSULIN (HCC): ICD-10-CM

## 2025-07-22 DIAGNOSIS — Z12.11 COLON CANCER SCREENING: ICD-10-CM

## 2025-07-22 DIAGNOSIS — Z01.00 DIABETIC EYE EXAM (HCC): ICD-10-CM

## 2025-07-22 DIAGNOSIS — E11.9 DIABETIC EYE EXAM (HCC): ICD-10-CM

## 2025-07-22 DIAGNOSIS — I10 PRIMARY HYPERTENSION: ICD-10-CM

## 2025-07-22 DIAGNOSIS — Z12.5 PROSTATE CANCER SCREENING: ICD-10-CM

## 2025-07-22 PROBLEM — I15.2 HYPERTENSION ASSOCIATED WITH TYPE 2 DIABETES MELLITUS (HCC): Status: RESOLVED | Noted: 2020-10-15 | Resolved: 2025-07-22

## 2025-07-22 PROBLEM — E11.69 HYPERLIPIDEMIA ASSOCIATED WITH TYPE 2 DIABETES MELLITUS (HCC): Status: RESOLVED | Noted: 2020-10-15 | Resolved: 2025-07-22

## 2025-07-22 PROBLEM — E78.5 HYPERLIPIDEMIA ASSOCIATED WITH TYPE 2 DIABETES MELLITUS (HCC): Status: RESOLVED | Noted: 2020-10-15 | Resolved: 2025-07-22

## 2025-07-22 PROBLEM — E11.59 HYPERTENSION ASSOCIATED WITH TYPE 2 DIABETES MELLITUS (HCC): Status: RESOLVED | Noted: 2020-10-15 | Resolved: 2025-07-22

## 2025-07-22 LAB — HEMOGLOBIN A1C: 7.2 % (ref 4.3–5.6)

## 2025-07-22 PROCEDURE — 83036 HEMOGLOBIN GLYCOSYLATED A1C: CPT | Performed by: INTERNAL MEDICINE

## 2025-07-22 PROCEDURE — G0439 PPPS, SUBSEQ VISIT: HCPCS | Performed by: INTERNAL MEDICINE

## 2025-07-22 NOTE — PROGRESS NOTES
Subjective:   Ray Phillips is a 74 year old male who presents for a Subsequent Annual Wellness visit (Pt already had Initial Annual Wellness) and scheduled follow up of multiple significant but stable problems.               History/Other:   Fall Risk Assessment:   He has been screened for Falls and is low risk.      Cognitive Assessment:   He had a completely normal cognitive assessment - see flowsheet entries     Functional Ability/Status:   Ray Phillips has a completely normal functional assessment. See flowsheet for details.      Depression Screening (PHQ):  PHQ-2 SCORE: 0  , done 7/22/2025            Advanced Directives:   He does have a Living Will but we do NOT have it on file in Epic.    He does have a POA but we do NOT have it on file in [place].    Patient has Advance Care Planning documents present in EMR. Reviewed documents with patient (and family/surrogate if present).      Problem List[1]  Allergies:  He has no known allergies.    Current Medications:  Active Meds, Sig Only[2]    Medical History:  He  has a past medical history of Calculus of kidney, Diabetes (HCC), Essential hypertension, and Hyperlipidemia.  Surgical History:  He  has a past surgical history that includes colonoscopy (N/A, 10/14/2021).   Family History:  His family history includes Cancer in his brother; Diabetes in his brother and sister; Stroke in his mother.  Social History:  He  reports that he quit smoking about 55 years ago. His smoking use included cigarettes. He has been exposed to tobacco smoke. He has never used smokeless tobacco. He reports that he does not currently use alcohol. He reports that he does not use drugs.    Tobacco:  He smoked tobacco in the past but quit greater than 12 months ago.  Tobacco Use[3]     CAGE Alcohol Screen:   CAGE screening score of 0 on 7/22/2025, showing low risk of alcohol abuse.      Patient Care Team:  Du Gardner MD as PCP - General (Internal Medicine)    Review of  Systems  GENERAL: feels well otherwise  SKIN: denies any unusual skin lesions  EYES: denies blurred vision or double vision  HEENT: denies nasal congestion, sinus pain or ST  LUNGS: denies shortness of breath with exertion  CARDIOVASCULAR: denies chest pain on exertion; no syncope; palpitation/pnd  GI: denies abdominal pain, denies heartburn; no hematochezia/hematemesis no chornic constpation  : 1 per night nocturia, no complaint of urinary incontinence; no hematuria  MUSCULOSKELETAL: denies back pain  NEURO: denies headaches  PSYCHE: denies depression or anxiety  HEMATOLOGIC: denies hx of anemia  ENDOCRINE: denies thyroid history  ALL/ASTHMA: denies hx of allergy or asthma    Objective:   Physical Exam  General Appearance:  Alert, cooperative, no distress, appears stated age   Head:  Normocephalic, without obvious abnormality, atraumatic   Eyes:  PERRL, conjunctiva/corneas clear, EOM's intact, both eyes   Ears:  Normal TM's and external ear canals, both ears   Nose: Nares normal, septum midline, mucosa normal, no drainage or sinus tenderness   Throat: Lips, mucosa, and tongue normal; teeth and gums normal   Neck: Supple, symmetrical, trachea midline, no adenopathy, thyroid: not enlarged, symmetric, no tenderness/mass/nodules, no carotid bruit or JVD   Back:   Symmetric, no curvature, ROM normal, no CVA tenderness   Lungs:   Clear to auscultation bilaterally, respirations unlabored   Chest Wall:  No tenderness or deformity   Heart:  Regular rate and rhythm, S1, S2 normal, no murmur, rub or gallop   Abdomen:   Soft, non-tender, bowel sounds active all four quadrants,  no masses, no organomegaly   Genitalia: Normal male   Rectal: Normal tone, symmetrical mildly enlarged prostate no nodules or indurations, no masses or tenderness   Extremities: Extremities normal, atraumatic, no cyanosis or edema   Pulses: 2+ and symmetric; monofilament testing normal in both feet.   Skin: Skin color, texture, turgor normal, no  rashes or lesions   Lymph nodes: Cervical, supraclavicular,  nodes normal   Neurologic: Normal     /70   Pulse 57   Ht 5' 5\" (1.651 m)   Wt 177 lb 7 oz (80.5 kg)   SpO2 96%   BMI 29.53 kg/m²  Estimated body mass index is 29.53 kg/m² as calculated from the following:    Height as of this encounter: 5' 5\" (1.651 m).    Weight as of this encounter: 177 lb 7 oz (80.5 kg).    Medicare Hearing Assessment:   Hearing Screening    Screening Method: Finger Rub  Finger Rub Result: Pass         Visual Acuity:   Right Eye Visual Acuity: Corrected Right Eye Chart Acuity: 20/25   Left Eye Visual Acuity: Corrected Left Eye Chart Acuity: 20/20   Both Eyes Visual Acuity: Corrected Both Eyes Chart Acuity: 20/15   Able To Tolerate Visual Acuity: Yes        Assessment & Plan:   Ray Phillips is a 74 year old male who presents for a Medicare Assessment.     1. Medicare annual wellness visit, subsequent  Routine labs been ordered.  Patient advised to get his RSV vaccine as well as tdap vaccine from the pharmacy.  - CBC With Differential With Platelet; Future  - Comp Metabolic Panel (14); Future    2. Type 2 diabetes mellitus with hyperglycemia, without long-term current use of insulin (HCC)  A1c today was 7.2 so continues to improve with metformin and diet alone which I told him to continue.  Recheck in 6 months.  - POC Glycohemoglobin [09683]  - Microalb/Creat Ratio, Random Urine; Future    3. Primary hypertension  Blood pressure controlled with current blood pressure meds.  CPM.    4. Mixed hyperlipidemia  Check lipid panel.  Continue low-fat low-cholesterol diet and current cholesterol medication.  - Comp Metabolic Panel (14); Future  - Lipid Panel; Future    5. Prostate cancer screening  Check PSA.  - PSA Total, Screen; Future    6. Colon cancer screening  See below.    7. History of colon polyps  Patient overdue for his colonoscopy and referred to GI.  - Gastro Referral - In [facility]     8.  Diabetic eye exam  ([facility])  Plan: Patient given referral for diabetic eye exam.    - Ophthalmology Referral - In Network    9. Screening for heart disease  Patient reminded again to do his heart scan calcium scoring test.  - CT CALCIUM SCORING; Future           The patient indicates understanding of these issues and agrees to the plan.  Reinforced healthy diet, lifestyle, and exercise.      No follow-ups on file.     Du Gardner MD, 7/22/2025     Supplementary Documentation:   General Health:  In the past six months, have you lost more than 10 pounds without trying?: 2 - No  Has your appetite been poor?: No  Type of Diet: Diabetic  How does the patient maintain a good energy level?: Daily Walks  How would you describe your daily physical activity?: Light  How would you describe your current health state?: Good  How do you maintain positive mental well-being?: Visiting Family  On a scale of 0 to 10, with 0 being no pain and 10 being severe pain, what is your pain level?: 0 - (None)  In the past six months, have you experienced urine leakage?: 0-No  At any time do you feel concerned for the safety/well-being of yourself and/or your children, in your home or elsewhere?: No  Have you had any immunizations at another office such as Influenza, Hepatitis B, Tetanus, or Pneumococcal?: No    Health Maintenance   Topic Date Due    Diabetes Care Dilated Eye Exam  04/14/2021    Colorectal Cancer Screening  10/14/2024    Diabetes Care: Microalb/Creat Ratio (Annual)  01/01/2025    Diabetes Care A1C  02/28/2025    Diabetes Care Foot Exam  05/03/2025    Diabetes Care: GFR  05/04/2025    Zoster Vaccines (1 of 2) 08/22/2025 (Originally 9/14/1999)    COVID-19 Vaccine (4 - 2024-25 season) 08/22/2026 (Originally 9/1/2024)    HTN: BP Follow-Up  08/22/2025    Influenza Vaccine (1) 10/01/2025    PSA  05/04/2026    Annual Physical  07/22/2026    Annual Depression Screening  Completed    Fall Risk Screening (Annual)  Completed    Pneumococcal Vaccine:  50+ Years  Completed    Meningococcal B Vaccine  Aged Out            [1]   Patient Active Problem List  Diagnosis    Type 2 diabetes mellitus with hyperglycemia, without long-term current use of insulin (HCC)    Primary hypertension    Mixed hyperlipidemia    Screening for heart disease    Controlled type 2 diabetes mellitus without complication, without long-term current use of insulin (HCC)    Erectile dysfunction    History of colon polyps   [2]   Outpatient Medications Marked as Taking for the 25 encounter (Office Visit) with Du Gardner MD   Medication Sig    metFORMIN HCl 1000 MG Oral Tab Take 1 tablet (1,000 mg total) by mouth nightly.    rosuvastatin 10 MG Oral Tab Take 1 tablet (10 mg total) by mouth nightly.    ramipril 5 MG Oral Cap Take 1 capsule (5 mg total) by mouth daily.    Accu-Chek Softclix Lancets Does not apply Misc Use once daily    Glucose Blood (ACCU-CHEK GUIDE) In Vitro Strip TEST ONCE DAILY    Emollient (AMLACTIN ULTRA SMOOTHING) 15 % External Cream Apply 1 Application topically daily as needed.    Blood Glucose Monitoring Suppl (ACCU-CHEK GUIDE) w/Device Does not apply Kit TEST DAILY BEFORE BREAKFAST AS DIRECTED    Omega-3 Fatty Acids (FISH OIL) 1200 MG Oral Cap Take 1 capsule by mouth daily.    Misc Natural Products (GLUCOSAMINE CHOND MSM FORMULA OR) Take 1 tablet by mouth daily.    Multiple Vitamin (MULTI-VITAMIN DAILY) Oral Tab Take 1 tablet by mouth daily.    Ginger, Zingiber officinalis, (GINGER ROOT) 550 MG Oral Cap Take 1 capsule by mouth daily.    Garlic 2000 MG Oral Cap Take 1 tablet by mouth daily.   [3]   Social History  Tobacco Use   Smoking Status Former    Current packs/day: 0.00    Types: Cigarettes    Quit date: 1970    Years since quittin.4    Passive exposure: Past   Smokeless Tobacco Never

## 2025-07-31 ENCOUNTER — LAB ENCOUNTER (OUTPATIENT)
Dept: LAB | Age: 75
End: 2025-07-31
Attending: INTERNAL MEDICINE

## 2025-07-31 DIAGNOSIS — E11.65 TYPE 2 DIABETES MELLITUS WITH HYPERGLYCEMIA, WITHOUT LONG-TERM CURRENT USE OF INSULIN (HCC): ICD-10-CM

## 2025-07-31 DIAGNOSIS — Z12.5 PROSTATE CANCER SCREENING: ICD-10-CM

## 2025-07-31 DIAGNOSIS — Z00.00 MEDICARE ANNUAL WELLNESS VISIT, SUBSEQUENT: ICD-10-CM

## 2025-07-31 DIAGNOSIS — E78.2 MIXED HYPERLIPIDEMIA: ICD-10-CM

## 2025-07-31 LAB
ALBUMIN SERPL-MCNC: 4.9 G/DL (ref 3.2–4.8)
ALBUMIN/GLOB SERPL: 2.3 (ref 1–2)
ALP LIVER SERPL-CCNC: 62 U/L (ref 45–117)
ALT SERPL-CCNC: 17 U/L (ref 10–49)
ANION GAP SERPL CALC-SCNC: 9 MMOL/L (ref 0–18)
AST SERPL-CCNC: 17 U/L (ref ?–34)
BASOPHILS # BLD AUTO: 0.05 X10(3) UL (ref 0–0.2)
BASOPHILS NFR BLD AUTO: 0.8 %
BILIRUB SERPL-MCNC: 0.5 MG/DL (ref 0.2–1.1)
BUN BLD-MCNC: 12 MG/DL (ref 9–23)
BUN/CREAT SERPL: 11.1 (ref 10–20)
CALCIUM BLD-MCNC: 9.4 MG/DL (ref 8.7–10.4)
CHLORIDE SERPL-SCNC: 100 MMOL/L (ref 98–112)
CHOLEST SERPL-MCNC: 109 MG/DL (ref ?–200)
CO2 SERPL-SCNC: 30 MMOL/L (ref 21–32)
COMPLEXED PSA SERPL-MCNC: 1.05 NG/ML (ref ?–4)
CREAT BLD-MCNC: 1.08 MG/DL (ref 0.7–1.3)
CREAT UR-SCNC: 134.3 MG/DL
DEPRECATED RDW RBC AUTO: 42.5 FL (ref 35.1–46.3)
EGFRCR SERPLBLD CKD-EPI 2021: 72 ML/MIN/1.73M2 (ref 60–?)
EOSINOPHIL # BLD AUTO: 0.06 X10(3) UL (ref 0–0.7)
EOSINOPHIL NFR BLD AUTO: 1 %
ERYTHROCYTE [DISTWIDTH] IN BLOOD BY AUTOMATED COUNT: 12.2 % (ref 11–15)
FASTING PATIENT LIPID ANSWER: YES
FASTING STATUS PATIENT QL REPORTED: YES
GLOBULIN PLAS-MCNC: 2.1 G/DL (ref 2–3.5)
GLUCOSE BLD-MCNC: 143 MG/DL (ref 70–99)
HCT VFR BLD AUTO: 44.5 % (ref 39–53)
HDLC SERPL-MCNC: 52 MG/DL (ref 40–59)
HGB BLD-MCNC: 15.5 G/DL (ref 13–17.5)
IMM GRANULOCYTES # BLD AUTO: 0.01 X10(3) UL (ref 0–1)
IMM GRANULOCYTES NFR BLD: 0.2 %
LDLC SERPL CALC-MCNC: 38 MG/DL (ref ?–100)
LYMPHOCYTES # BLD AUTO: 2.49 X10(3) UL (ref 1–4)
LYMPHOCYTES NFR BLD AUTO: 42.1 %
MCH RBC QN AUTO: 32.6 PG (ref 26–34)
MCHC RBC AUTO-ENTMCNC: 34.8 G/DL (ref 31–37)
MCV RBC AUTO: 93.7 FL (ref 80–100)
MICROALBUMIN UR-MCNC: 0.3 MG/DL
MICROALBUMIN/CREAT 24H UR-RTO: 2.2 UG/MG (ref ?–30)
MONOCYTES # BLD AUTO: 0.41 X10(3) UL (ref 0.1–1)
MONOCYTES NFR BLD AUTO: 6.9 %
NEUTROPHILS # BLD AUTO: 2.9 X10 (3) UL (ref 1.5–7.7)
NEUTROPHILS # BLD AUTO: 2.9 X10(3) UL (ref 1.5–7.7)
NEUTROPHILS NFR BLD AUTO: 49 %
NONHDLC SERPL-MCNC: 57 MG/DL (ref ?–130)
OSMOLALITY SERPL CALC.SUM OF ELEC: 290 MOSM/KG (ref 275–295)
PLATELET # BLD AUTO: 162 10(3)UL (ref 150–450)
POTASSIUM SERPL-SCNC: 4.1 MMOL/L (ref 3.5–5.1)
PROT SERPL-MCNC: 7 G/DL (ref 5.7–8.2)
RBC # BLD AUTO: 4.75 X10(6)UL (ref 3.8–5.8)
SODIUM SERPL-SCNC: 139 MMOL/L (ref 136–145)
TRIGL SERPL-MCNC: 104 MG/DL (ref 30–149)
VLDLC SERPL CALC-MCNC: 14 MG/DL (ref 0–30)
WBC # BLD AUTO: 5.9 X10(3) UL (ref 4–11)

## 2025-07-31 PROCEDURE — 82043 UR ALBUMIN QUANTITATIVE: CPT

## 2025-07-31 PROCEDURE — 82570 ASSAY OF URINE CREATININE: CPT

## 2025-07-31 PROCEDURE — 85025 COMPLETE CBC W/AUTO DIFF WBC: CPT

## 2025-07-31 PROCEDURE — 80053 COMPREHEN METABOLIC PANEL: CPT

## 2025-07-31 PROCEDURE — 80061 LIPID PANEL: CPT

## 2025-07-31 PROCEDURE — 36415 COLL VENOUS BLD VENIPUNCTURE: CPT

## (undated) DEVICE — HEXAGONAL CAPTIVATOR STIFF 13M

## (undated) DEVICE — KIT CLEAN ENDOKIT 1.1OZ GOWNX2

## (undated) DEVICE — Device: Brand: DEFENDO AIR/WATER/SUCTION AND BIOPSY VALVE

## (undated) DEVICE — FORCEP RADIAL JAW 4

## (undated) DEVICE — 35 ML SYRINGE REGULAR TIP: Brand: MONOJECT

## (undated) DEVICE — STERILE LATEX POWDER-FREE SURGICAL GLOVESWITH NITRILE COATING: Brand: PROTEXIS

## (undated) DEVICE — MEDI-VAC NON-CONDUCTIVE SUCTION TUBING 6MM X 1.8M (6FT.) L: Brand: CARDINAL HEALTH

## (undated) DEVICE — SNARE OPTMZ PLPCTM TRP

## (undated) DEVICE — LINE MNTR ADLT SET O2 INTMD